# Patient Record
Sex: FEMALE | Race: WHITE | Employment: FULL TIME | URBAN - METROPOLITAN AREA
[De-identification: names, ages, dates, MRNs, and addresses within clinical notes are randomized per-mention and may not be internally consistent; named-entity substitution may affect disease eponyms.]

---

## 2018-08-14 PROBLEM — R10.9 FLANK PAIN: Status: ACTIVE | Noted: 2018-08-14

## 2019-04-14 ENCOUNTER — ANESTHESIA EVENT (OUTPATIENT)
Dept: SURGERY | Age: 29
DRG: 661 | End: 2019-04-14
Payer: COMMERCIAL

## 2019-04-14 ENCOUNTER — APPOINTMENT (OUTPATIENT)
Dept: GENERAL RADIOLOGY | Age: 29
DRG: 661 | End: 2019-04-14
Attending: UROLOGY
Payer: COMMERCIAL

## 2019-04-14 ENCOUNTER — HOSPITAL ENCOUNTER (INPATIENT)
Age: 29
LOS: 2 days | Discharge: HOME | DRG: 661 | End: 2019-04-16
Attending: EMERGENCY MEDICINE | Admitting: HOSPITALIST
Payer: COMMERCIAL

## 2019-04-14 ENCOUNTER — APPOINTMENT (OUTPATIENT)
Dept: CT IMAGING | Age: 29
DRG: 661 | End: 2019-04-14
Attending: NURSE PRACTITIONER
Payer: COMMERCIAL

## 2019-04-14 ENCOUNTER — ANESTHESIA (OUTPATIENT)
Dept: SURGERY | Age: 29
DRG: 661 | End: 2019-04-14
Payer: COMMERCIAL

## 2019-04-14 DIAGNOSIS — R10.9 FLANK PAIN: ICD-10-CM

## 2019-04-14 DIAGNOSIS — N20.0 KIDNEY STONE: ICD-10-CM

## 2019-04-14 DIAGNOSIS — N12 PYELONEPHRITIS: Primary | ICD-10-CM

## 2019-04-14 DIAGNOSIS — N39.0 ACUTE UTI (URINARY TRACT INFECTION): ICD-10-CM

## 2019-04-14 PROBLEM — N13.30 HYDRONEPHROSIS: Status: ACTIVE | Noted: 2019-04-14

## 2019-04-14 LAB
ALBUMIN SERPL-MCNC: 3.3 G/DL (ref 3.4–5)
ALBUMIN/GLOB SERPL: 0.9 {RATIO} (ref 0.8–1.7)
ALP SERPL-CCNC: 71 U/L (ref 45–117)
ALT SERPL-CCNC: 17 U/L (ref 13–56)
ANION GAP SERPL CALC-SCNC: 7 MMOL/L (ref 3–18)
APPEARANCE UR: CLEAR
AST SERPL-CCNC: 10 U/L (ref 15–37)
BACTERIA URNS QL MICRO: ABNORMAL /HPF
BASOPHILS # BLD: 0 K/UL (ref 0–0.1)
BASOPHILS NFR BLD: 0 % (ref 0–2)
BILIRUB SERPL-MCNC: 0.4 MG/DL (ref 0.2–1)
BILIRUB UR QL: NEGATIVE
BUN SERPL-MCNC: 7 MG/DL (ref 7–18)
BUN/CREAT SERPL: 8 (ref 12–20)
CALCIUM SERPL-MCNC: 8.8 MG/DL (ref 8.5–10.1)
CHLORIDE SERPL-SCNC: 106 MMOL/L (ref 100–108)
CO2 SERPL-SCNC: 23 MMOL/L (ref 21–32)
COLOR UR: YELLOW
CREAT SERPL-MCNC: 0.86 MG/DL (ref 0.6–1.3)
DIFFERENTIAL METHOD BLD: ABNORMAL
EOSINOPHIL # BLD: 0.1 K/UL (ref 0–0.4)
EOSINOPHIL NFR BLD: 0 % (ref 0–5)
EPITH CASTS URNS QL MICRO: ABNORMAL /LPF (ref 0–5)
ERYTHROCYTE [DISTWIDTH] IN BLOOD BY AUTOMATED COUNT: 12.2 % (ref 11.6–14.5)
GLOBULIN SER CALC-MCNC: 3.7 G/DL (ref 2–4)
GLUCOSE SERPL-MCNC: 108 MG/DL (ref 74–99)
GLUCOSE UR STRIP.AUTO-MCNC: NEGATIVE MG/DL
HCG UR QL: NEGATIVE
HCT VFR BLD AUTO: 34.7 % (ref 35–45)
HGB BLD-MCNC: 11.5 G/DL (ref 12–16)
HGB UR QL STRIP: ABNORMAL
KETONES UR QL STRIP.AUTO: NEGATIVE MG/DL
LEUKOCYTE ESTERASE UR QL STRIP.AUTO: ABNORMAL
LYMPHOCYTES # BLD: 0.9 K/UL (ref 0.9–3.6)
LYMPHOCYTES NFR BLD: 5 % (ref 21–52)
MCH RBC QN AUTO: 29.4 PG (ref 24–34)
MCHC RBC AUTO-ENTMCNC: 33.1 G/DL (ref 31–37)
MCV RBC AUTO: 88.7 FL (ref 74–97)
MONOCYTES # BLD: 1.6 K/UL (ref 0.05–1.2)
MONOCYTES NFR BLD: 9 % (ref 3–10)
NEUTS SEG # BLD: 16 K/UL (ref 1.8–8)
NEUTS SEG NFR BLD: 86 % (ref 40–73)
NITRITE UR QL STRIP.AUTO: NEGATIVE
PH UR STRIP: 6.5 [PH] (ref 5–8)
PLATELET # BLD AUTO: 237 K/UL (ref 135–420)
PMV BLD AUTO: 9.3 FL (ref 9.2–11.8)
POTASSIUM SERPL-SCNC: 4.3 MMOL/L (ref 3.5–5.5)
PROT SERPL-MCNC: 7 G/DL (ref 6.4–8.2)
PROT UR STRIP-MCNC: NEGATIVE MG/DL
RBC # BLD AUTO: 3.91 M/UL (ref 4.2–5.3)
RBC #/AREA URNS HPF: ABNORMAL /HPF (ref 0–5)
SODIUM SERPL-SCNC: 136 MMOL/L (ref 136–145)
SP GR UR REFRACTOMETRY: 1.01 (ref 1–1.03)
UROBILINOGEN UR QL STRIP.AUTO: 0.2 EU/DL (ref 0.2–1)
WBC # BLD AUTO: 18.5 K/UL (ref 4.6–13.2)
WBC URNS QL MICRO: ABNORMAL /HPF (ref 0–4)

## 2019-04-14 PROCEDURE — 99283 EMERGENCY DEPT VISIT LOW MDM: CPT

## 2019-04-14 PROCEDURE — 80053 COMPREHEN METABOLIC PANEL: CPT

## 2019-04-14 PROCEDURE — 81025 URINE PREGNANCY TEST: CPT

## 2019-04-14 PROCEDURE — 96361 HYDRATE IV INFUSION ADD-ON: CPT

## 2019-04-14 PROCEDURE — 77030018832 HC SOL IRR H20 ICUM -A: Performed by: UROLOGY

## 2019-04-14 PROCEDURE — 77030012510 HC MSK AIRWY LMA TELE -B: Performed by: NURSE ANESTHETIST, CERTIFIED REGISTERED

## 2019-04-14 PROCEDURE — 74011000258 HC RX REV CODE- 258: Performed by: NURSE PRACTITIONER

## 2019-04-14 PROCEDURE — 74176 CT ABD & PELVIS W/O CONTRAST: CPT

## 2019-04-14 PROCEDURE — 96374 THER/PROPH/DIAG INJ IV PUSH: CPT

## 2019-04-14 PROCEDURE — 77030032490 HC SLV COMPR SCD KNE COVD -B: Performed by: UROLOGY

## 2019-04-14 PROCEDURE — 85025 COMPLETE CBC W/AUTO DIFF WBC: CPT

## 2019-04-14 PROCEDURE — 74011250636 HC RX REV CODE- 250/636: Performed by: NURSE PRACTITIONER

## 2019-04-14 PROCEDURE — 77030018836 HC SOL IRR NACL ICUM -A: Performed by: UROLOGY

## 2019-04-14 PROCEDURE — 96375 TX/PRO/DX INJ NEW DRUG ADDON: CPT

## 2019-04-14 PROCEDURE — 87086 URINE CULTURE/COLONY COUNT: CPT

## 2019-04-14 PROCEDURE — 74011250636 HC RX REV CODE- 250/636: Performed by: HOSPITALIST

## 2019-04-14 PROCEDURE — C1894 INTRO/SHEATH, NON-LASER: HCPCS | Performed by: UROLOGY

## 2019-04-14 PROCEDURE — 77030021163 HC TUBE CYSTO IRR ICUM -A: Performed by: UROLOGY

## 2019-04-14 PROCEDURE — C2617 STENT, NON-COR, TEM W/O DEL: HCPCS | Performed by: UROLOGY

## 2019-04-14 PROCEDURE — 76210000006 HC OR PH I REC 0.5 TO 1 HR: Performed by: UROLOGY

## 2019-04-14 PROCEDURE — 77030013079 HC BLNKT BAIR HGGR 3M -A: Performed by: NURSE ANESTHETIST, CERTIFIED REGISTERED

## 2019-04-14 PROCEDURE — 77030018846 HC SOL IRR STRL H20 ICUM -A: Performed by: UROLOGY

## 2019-04-14 PROCEDURE — 74011000272 HC RX REV CODE- 272: Performed by: UROLOGY

## 2019-04-14 PROCEDURE — 76010000159 HC OR TIME FIRST 0.5 HR INTENSV-TIER 1: Performed by: UROLOGY

## 2019-04-14 PROCEDURE — 74011250636 HC RX REV CODE- 250/636

## 2019-04-14 PROCEDURE — 0T778DZ DILATION OF LEFT URETER WITH INTRALUMINAL DEVICE, VIA NATURAL OR ARTIFICIAL OPENING ENDOSCOPIC: ICD-10-PCS | Performed by: UROLOGY

## 2019-04-14 PROCEDURE — 74430 CONTRAST X-RAY BLADDER: CPT

## 2019-04-14 PROCEDURE — 76060000031 HC ANESTHESIA FIRST 0.5 HR: Performed by: UROLOGY

## 2019-04-14 PROCEDURE — 65270000029 HC RM PRIVATE

## 2019-04-14 PROCEDURE — 81001 URINALYSIS AUTO W/SCOPE: CPT

## 2019-04-14 PROCEDURE — 74011000250 HC RX REV CODE- 250: Performed by: UROLOGY

## 2019-04-14 DEVICE — INLAY OPTIMA URETERAL STENT W/O GUIDEWIRE
Type: IMPLANTABLE DEVICE | Site: URETER | Status: FUNCTIONAL
Brand: BARD® INLAY OPTIMA® URETERAL STENT

## 2019-04-14 RX ORDER — FENTANYL CITRATE 50 UG/ML
INJECTION, SOLUTION INTRAMUSCULAR; INTRAVENOUS
Status: COMPLETED
Start: 2019-04-14 | End: 2019-04-14

## 2019-04-14 RX ORDER — MIDAZOLAM HYDROCHLORIDE 1 MG/ML
INJECTION, SOLUTION INTRAMUSCULAR; INTRAVENOUS AS NEEDED
Status: DISCONTINUED | OUTPATIENT
Start: 2019-04-14 | End: 2019-04-14 | Stop reason: HOSPADM

## 2019-04-14 RX ORDER — ENOXAPARIN SODIUM 100 MG/ML
40 INJECTION SUBCUTANEOUS EVERY 24 HOURS
Status: DISCONTINUED | OUTPATIENT
Start: 2019-04-14 | End: 2019-04-16 | Stop reason: HOSPADM

## 2019-04-14 RX ORDER — FENTANYL CITRATE 50 UG/ML
INJECTION, SOLUTION INTRAMUSCULAR; INTRAVENOUS AS NEEDED
Status: DISCONTINUED | OUTPATIENT
Start: 2019-04-14 | End: 2019-04-14 | Stop reason: HOSPADM

## 2019-04-14 RX ORDER — FENTANYL CITRATE 50 UG/ML
25 INJECTION, SOLUTION INTRAMUSCULAR; INTRAVENOUS
Status: DISCONTINUED | OUTPATIENT
Start: 2019-04-14 | End: 2019-04-14 | Stop reason: HOSPADM

## 2019-04-14 RX ORDER — DEXAMETHASONE SODIUM PHOSPHATE 4 MG/ML
INJECTION, SOLUTION INTRA-ARTICULAR; INTRALESIONAL; INTRAMUSCULAR; INTRAVENOUS; SOFT TISSUE AS NEEDED
Status: DISCONTINUED | OUTPATIENT
Start: 2019-04-14 | End: 2019-04-14 | Stop reason: HOSPADM

## 2019-04-14 RX ORDER — NALOXONE HYDROCHLORIDE 0.4 MG/ML
0.2 INJECTION, SOLUTION INTRAMUSCULAR; INTRAVENOUS; SUBCUTANEOUS AS NEEDED
Status: DISCONTINUED | OUTPATIENT
Start: 2019-04-14 | End: 2019-04-14 | Stop reason: HOSPADM

## 2019-04-14 RX ORDER — ZOLPIDEM TARTRATE 5 MG/1
5 TABLET ORAL
Status: DISCONTINUED | OUTPATIENT
Start: 2019-04-14 | End: 2019-04-16 | Stop reason: HOSPADM

## 2019-04-14 RX ORDER — FENTANYL CITRATE 50 UG/ML
50 INJECTION, SOLUTION INTRAMUSCULAR; INTRAVENOUS
Status: DISCONTINUED | OUTPATIENT
Start: 2019-04-14 | End: 2019-04-14 | Stop reason: HOSPADM

## 2019-04-14 RX ORDER — SODIUM CHLORIDE 9 MG/ML
125 INJECTION, SOLUTION INTRAVENOUS CONTINUOUS
Status: DISCONTINUED | OUTPATIENT
Start: 2019-04-14 | End: 2019-04-16

## 2019-04-14 RX ORDER — PROPOFOL 10 MG/ML
INJECTION, EMULSION INTRAVENOUS AS NEEDED
Status: DISCONTINUED | OUTPATIENT
Start: 2019-04-14 | End: 2019-04-14 | Stop reason: HOSPADM

## 2019-04-14 RX ORDER — SODIUM CHLORIDE 9 MG/ML
125 INJECTION, SOLUTION INTRAVENOUS CONTINUOUS
Status: DISCONTINUED | OUTPATIENT
Start: 2019-04-14 | End: 2019-04-14 | Stop reason: HOSPADM

## 2019-04-14 RX ORDER — HYDROMORPHONE HYDROCHLORIDE 1 MG/ML
1 INJECTION, SOLUTION INTRAMUSCULAR; INTRAVENOUS; SUBCUTANEOUS
Status: DISCONTINUED | OUTPATIENT
Start: 2019-04-14 | End: 2019-04-16 | Stop reason: HOSPADM

## 2019-04-14 RX ORDER — ACETAMINOPHEN 325 MG/1
650 TABLET ORAL
Status: DISCONTINUED | OUTPATIENT
Start: 2019-04-14 | End: 2019-04-16 | Stop reason: HOSPADM

## 2019-04-14 RX ORDER — LIDOCAINE HYDROCHLORIDE 20 MG/ML
INJECTION, SOLUTION EPIDURAL; INFILTRATION; INTRACAUDAL; PERINEURAL AS NEEDED
Status: DISCONTINUED | OUTPATIENT
Start: 2019-04-14 | End: 2019-04-14 | Stop reason: HOSPADM

## 2019-04-14 RX ORDER — ONDANSETRON 4 MG/1
4 TABLET, ORALLY DISINTEGRATING ORAL
Status: DISCONTINUED | OUTPATIENT
Start: 2019-04-14 | End: 2019-04-16 | Stop reason: HOSPADM

## 2019-04-14 RX ORDER — SODIUM CHLORIDE 9 MG/ML
INJECTION, SOLUTION INTRAVENOUS
Status: DISCONTINUED | OUTPATIENT
Start: 2019-04-14 | End: 2019-04-14 | Stop reason: HOSPADM

## 2019-04-14 RX ORDER — KETOROLAC TROMETHAMINE 30 MG/ML
30 INJECTION, SOLUTION INTRAMUSCULAR; INTRAVENOUS
Status: COMPLETED | OUTPATIENT
Start: 2019-04-14 | End: 2019-04-14

## 2019-04-14 RX ORDER — ONDANSETRON 2 MG/ML
4 INJECTION INTRAMUSCULAR; INTRAVENOUS
Status: DISCONTINUED | OUTPATIENT
Start: 2019-04-14 | End: 2019-04-14 | Stop reason: HOSPADM

## 2019-04-14 RX ORDER — MORPHINE SULFATE 4 MG/ML
8 INJECTION INTRAVENOUS
Status: COMPLETED | OUTPATIENT
Start: 2019-04-14 | End: 2019-04-14

## 2019-04-14 RX ORDER — HYDROMORPHONE HYDROCHLORIDE 1 MG/ML
2 INJECTION, SOLUTION INTRAMUSCULAR; INTRAVENOUS; SUBCUTANEOUS
Status: COMPLETED | OUTPATIENT
Start: 2019-04-14 | End: 2019-04-14

## 2019-04-14 RX ORDER — ONDANSETRON 2 MG/ML
INJECTION INTRAMUSCULAR; INTRAVENOUS AS NEEDED
Status: DISCONTINUED | OUTPATIENT
Start: 2019-04-14 | End: 2019-04-14 | Stop reason: HOSPADM

## 2019-04-14 RX ADMIN — SODIUM CHLORIDE 125 ML/HR: 900 INJECTION, SOLUTION INTRAVENOUS at 17:48

## 2019-04-14 RX ADMIN — FENTANYL CITRATE 50 MCG: 50 INJECTION, SOLUTION INTRAMUSCULAR; INTRAVENOUS at 21:17

## 2019-04-14 RX ADMIN — ONDANSETRON 4 MG: 2 INJECTION INTRAMUSCULAR; INTRAVENOUS at 20:43

## 2019-04-14 RX ADMIN — SODIUM CHLORIDE 125 ML/HR: 900 INJECTION, SOLUTION INTRAVENOUS at 22:40

## 2019-04-14 RX ADMIN — FENTANYL CITRATE 50 MCG: 50 INJECTION, SOLUTION INTRAMUSCULAR; INTRAVENOUS at 21:27

## 2019-04-14 RX ADMIN — ENOXAPARIN SODIUM 40 MG: 40 INJECTION SUBCUTANEOUS at 22:55

## 2019-04-14 RX ADMIN — FENTANYL CITRATE 50 MCG: 50 INJECTION, SOLUTION INTRAMUSCULAR; INTRAVENOUS at 20:37

## 2019-04-14 RX ADMIN — MIDAZOLAM HYDROCHLORIDE 2 MG: 1 INJECTION, SOLUTION INTRAMUSCULAR; INTRAVENOUS at 20:33

## 2019-04-14 RX ADMIN — FENTANYL CITRATE 25 MCG: 50 INJECTION, SOLUTION INTRAMUSCULAR; INTRAVENOUS at 20:42

## 2019-04-14 RX ADMIN — FENTANYL CITRATE 25 MCG: 50 INJECTION, SOLUTION INTRAMUSCULAR; INTRAVENOUS at 20:49

## 2019-04-14 RX ADMIN — SODIUM CHLORIDE 1000 ML: 900 INJECTION, SOLUTION INTRAVENOUS at 13:10

## 2019-04-14 RX ADMIN — LIDOCAINE HYDROCHLORIDE 40 MG: 20 INJECTION, SOLUTION EPIDURAL; INFILTRATION; INTRACAUDAL; PERINEURAL at 20:37

## 2019-04-14 RX ADMIN — CEFTRIAXONE 1 G: 1 INJECTION, POWDER, FOR SOLUTION INTRAMUSCULAR; INTRAVENOUS at 16:26

## 2019-04-14 RX ADMIN — HYDROMORPHONE HYDROCHLORIDE 2 MG: 1 INJECTION, SOLUTION INTRAMUSCULAR; INTRAVENOUS; SUBCUTANEOUS at 16:20

## 2019-04-14 RX ADMIN — MORPHINE SULFATE 8 MG: 4 INJECTION INTRAVENOUS at 13:41

## 2019-04-14 RX ADMIN — PROPOFOL 150 MG: 10 INJECTION, EMULSION INTRAVENOUS at 20:37

## 2019-04-14 RX ADMIN — SODIUM CHLORIDE: 9 INJECTION, SOLUTION INTRAVENOUS at 20:33

## 2019-04-14 RX ADMIN — DEXAMETHASONE SODIUM PHOSPHATE 4 MG: 4 INJECTION, SOLUTION INTRA-ARTICULAR; INTRALESIONAL; INTRAMUSCULAR; INTRAVENOUS; SOFT TISSUE at 20:43

## 2019-04-14 RX ADMIN — KETOROLAC TROMETHAMINE 30 MG: 30 INJECTION, SOLUTION INTRAMUSCULAR; INTRAVENOUS at 13:12

## 2019-04-14 NOTE — ROUTINE PROCESS
TRANSFER - ED to INPATIENT REPORT: 
 
SBAR report made available to receiving floor on this patient being transferred to 2 [place] ([id number])  for routine progression of care Admitting diagnosis UTI (urinary tract infection) [N39.0] Information from the following report(s) SBAR, ED Summary, Intake/Output, MAR and Recent Results was made available to receiving floor. Lines:  
Peripheral IV 04/14/19 Right Antecubital (Active) Site Assessment Clean, dry, & intact 4/14/2019  1:21 PM  
Phlebitis Assessment 0 4/14/2019  1:21 PM  
Infiltration Assessment 0 4/14/2019  1:21 PM  
Dressing Status Clean, dry, & intact 4/14/2019  1:21 PM  
Dressing Type Transparent 4/14/2019  1:21 PM  
Hub Color/Line Status Pink;Flushed 4/14/2019  1:21 PM  
Action Taken Blood drawn 4/14/2019  1:21 PM  
  
 
Medication list confirmed with patient Opportunity for questions and clarification was provided. Patient is oriented to time, place, person and situation Patient is  continent and ambulatory without assist 
  
Valuables transported with patient Patient transported with: 
 Tech 
 
  =Monitored (most recent) Vitals w/ MEWS Score (last day) Date/Time MEWS Score Pulse Resp Temp BP Level of Consciousness SpO2  
 04/14/19 16:29:02  2  106  (Abnormal)   18  98.8 °F (37.1 °C)  113/74  Alert  100 % 04/14/19 15:14:58    100  18    117/74  Alert  100 % 04/14/19 1306  3  125  (Abnormal)   18  98.2 °F (36.8 °C)  136/78  Alert  100 % Septic Patients:  
 
Lactic Acid: None ordered. ALL LACTIC ACIDS GREATER THAN 2 MUST BE REPEATED POC WITHIN 4 HOURS OR PRIOR TO ADMISSION Total Fluid Bolus initiated and documented on MAR: YES 1 liter NS infused.

## 2019-04-14 NOTE — PROGRESS NOTES
Internal Medicine History and Physical 
   
 
 
Subjective HPI: Simba Singh is a 26 y.o. female with a PMHx of multiple kidney stones requiring lithotripsy who presented to the ED with flank pain for the past four days. She c/o dysuria, abd pain, nausea as well. Denies fevers, chills, and vomiting. In the ED urology was consulted, and CT abd shows left sided hydronephrosis with 3mm stone, and bilateral non obstructive stones, as well as an UTI. She will be admitted for further eval 
 
PMHx: 
Past Medical History:  
Diagnosis Date  Asthma  Flank pain  Kidney stone PSurgHx: 
Past Surgical History:  
Procedure Laterality Date  HX LITHOTRIPSY  07/11/2012 LITHOTRIPSY Procedure:LITHOTRIPSY; Surgeon:JASPER MERIDA; Location:[facility] MAIN OR LOC; Service:Urology; Laterality:Right; ESWL RIGHT  HX LITHOTRIPSY  08/08/2012  
 :LITHOTRIPSY; Roly Richard; Location:[facility] MAIN OR LOC; Service:Urology; Laterality:Left; ESWL LEFT CPT - 51592  
 HX TONSILLECTOMY SocialHx: 
Social History Socioeconomic History  Marital status: SINGLE Spouse name: Not on file  Number of children: Not on file  Years of education: Not on file  Highest education level: Not on file Occupational History  Not on file Social Needs  Financial resource strain: Not on file  Food insecurity:  
  Worry: Not on file Inability: Not on file  Transportation needs:  
  Medical: Not on file Non-medical: Not on file Tobacco Use  Smoking status: Never Smoker  Smokeless tobacco: Never Used Substance and Sexual Activity  Alcohol use: Yes Alcohol/week: 1.8 oz Types: 3 Cans of beer per week Comment: occasional  
 Drug use: No  
 Sexual activity: Not Currently Partners: Male Lifestyle  Physical activity:  
  Days per week: Not on file Minutes per session: Not on file  Stress: Not on file Relationships  Social connections: Talks on phone: Not on file Gets together: Not on file Attends Tenriism service: Not on file Active member of club or organization: Not on file Attends meetings of clubs or organizations: Not on file Relationship status: Not on file  Intimate partner violence:  
  Fear of current or ex partner: Not on file Emotionally abused: Not on file Physically abused: Not on file Forced sexual activity: Not on file Other Topics Concern  Not on file Social History Narrative  Not on file Allergies: 
No Known Allergies FamilyHx: 
Family History Problem Relation Age of Onset  Kidney Disease Mother  Kidney Disease Father  Heart Disease Father Prior to Admission Medications Prescriptions Last Dose Informant Patient Reported? Taking? ALPRAZolam (XANAX) 0.25 mg tablet   Yes No  
clindamycin-benzoyl peroxide (BENZACLIN) 1-5 % topical gel   Yes No  
eszopiclone (LUNESTA) 2 mg tablet   Yes No  
fluticasone propionate (FLONASE) 50 mcg/actuation nasal spray   Yes No  
loratadine (CLARITIN) 10 mg tablet   Yes No  
medroxyprogesterone acetate (DEPO-PROVERA IM)   Yes No  
Sig: by IntraMUSCular route. ondansetron (ZOFRAN ODT) 4 mg disintegrating tablet   No No  
Sig: Take 1 Tab by mouth every eight (8) hours as needed for Nausea. Facility-Administered Medications: None Review of Systems: 
CONST: Fever, weight loss, fatigue or chills HEENT: Recent changes in vision, vertigo, epistaxis, dysphagia and hoarseness CV: Chest pain, palpitations, edema and varicosities RESP: Cough, shortness of breath, wheezing, hemoptysis, snoring and reactive airway disease GI: Nausea, vomiting, abdominal pain, change in bowel habits, hematochezia, melena, and GERD 
: Hematuria, dysuria, frequency, urgency, nocturia and stress urinary incontinence, flank pain MS: Weakness, joint pain and arthritis ENDO: Polyuria, polydipsia, polyphagia, poor wound healing, heat intolerance, cold intolerance LYMPH/HEME: Anemia, bruising and history of blood transfusions INTEG: Dermatitis, abnormal moles NEURO: Dizziness, headache, fainting, seizures and stroke PSYCH: Anxiety and depression A comprehensive review of systems was negative except for that written in the History of Present Illness. 
  
Objective Visit Vitals /74 (BP 1 Location: Right arm, BP Patient Position: At rest) Pulse 100 Temp 98.2 °F (36.8 °C) Resp 18 Ht 5' 6\" (1.676 m) Wt 61.2 kg (135 lb) SpO2 100% BMI 21.79 kg/m² Physical Exam: 
General Appearance: appears to be in mild distress, conversant, non-toxic appearing HENT: normocephalic/atraumatic, moist mucus membranes Lungs: CTA with normal respiratory effort Cardiovascular: RRR, no m/r/g, capillary refill < 2 sec, B/L DP/PT pulses +3/4 Abdomen: soft, non distended, non-tender, active bowel sounds, no guarding or rigidity Extremities: no cyanosis, no peripheral edema, good perfusion bilaterally Neuro: moves all extremities, no focal deficits, nml tone Psych: appropriate affect, alert and oriented to person, place and time Laboratory Studies: All lab results for the last 24 hours reviewed. Imaging Reviewed: 
Ct Abd Pelv Wo Cont Result Date: 4/14/2019 EXAM: CT of the abdomen and pelvis INDICATION: Left flank abdominal pain COMPARISON: None. TECHNIQUE: Axial CT imaging of the abdomen and pelvis was performed with intravenous contrast. Multiplanar reformats were generated. One or more dose reduction techniques were used on this CT: automated exposure control, adjustment of the mAs and/or kVp according to patient size, and iterative reconstruction techniques. The specific techniques used on this CT exam have been documented in the patient's electronic medical record.  Digital Imaging and Communications in Medicine (DICOM) format image data are available to nonaffiliated external healthcare facilities or entities on a secure, media free, reciprocally searchable basis with patient authorization for at least a 12-month period after this study. _______________ FINDINGS: LOWER CHEST: Minimal basilar dependent changes without consolidation or effusion. Partial visualized bilateral breast implants. LIVER, BILIARY: Liver is unremarkable on noncontrast imaging. No biliary dilation. Gallbladder is unremarkable. PANCREAS: Normal. SPLEEN: Normal. ADRENALS: Normal. KIDNEYS, URETERS, BLADDER:   > Mild left-sided hydroureteronephrosis secondary to a 3 mm mid to proximal ureteral calculus, 4 to 5 cm below the UPJ.   > Additional bilateral nonobstructive renal calculi are present measuring 2 to 3 mm in size. No findings of right-sided hydronephrosis or hydroureter. Unremarkable bladder GASTROINTESTINAL TRACT: No bowel dilation or wall thickening. Normal appendix. LYMPH NODES: No enlarged lymph nodes. PELVIC ORGANS: Unremarkable. VASCULATURE: Unremarkable. OSSEOUS: No acute or aggressive osseous abnormalities identified. OTHER: None. _______________ IMPRESSION: 1. Left-sided hydronephrosis secondary to a 3 mm, mid to proximal ureteral stone. 2. Bilateral nonobstructive renal calculi. Assessment/Plan Active Hospital Problems Diagnosis Date Noted  UTI (urinary tract infection) 04/14/2019 Priority: 1 - One  Kidney stone Priority: 2 - Two  Hydronephrosis 04/14/2019 Priority: 3 - Three UTI Iv abx Hydronephrosis 
left sided 3mm mid to proximal ureteral calculus, 4-5 cm below UPJ Urology consult Most likely due to kidney stone Kidney stone IV fluids Pain control Suspect infected stone WBC 18.5 trend Potentially OR tonight? 
 
- Cont acceptable home medications for chronic conditions  
- DVT protocol I have personally reviewed all pertinent labs, films and EKGs that have officially resulted.  I reviewed available electronic documentation outlining the initial presentation as well as the emergency room physician's encounter. Donny Lira PA-C Internal Medicine, Hospitalist 
Pager: [phone] [facility]

## 2019-04-14 NOTE — ED PROVIDER NOTES
HPI  
 
Past Medical History:  
Diagnosis Date  Asthma  Flank pain  Kidney stone Past Surgical History:  
Procedure Laterality Date  HX LITHOTRIPSY  07/11/2012 LITHOTRIPSY Procedure:LITHOTRIPSY; Surgeon:JASPER MERIDA; Location:[facility] MAIN OR LOC; Service:Urology; Laterality:Right; ESWL RIGHT  HX LITHOTRIPSY  08/08/2012  
 :LITHOTRIPSY; Lilia Moreno; Location:[facility] MAIN OR LOC; Service:Urology; Laterality:Left; ESWL LEFT CPT - 31067  
 HX TONSILLECTOMY Family History:  
Problem Relation Age of Onset  Kidney Disease Mother  Kidney Disease Father  Heart Disease Father Social History Socioeconomic History  Marital status: SINGLE Spouse name: Not on file  Number of children: Not on file  Years of education: Not on file  Highest education level: Not on file Occupational History  Not on file Social Needs  Financial resource strain: Not on file  Food insecurity:  
  Worry: Not on file Inability: Not on file  Transportation needs:  
  Medical: Not on file Non-medical: Not on file Tobacco Use  Smoking status: Never Smoker  Smokeless tobacco: Never Used Substance and Sexual Activity  Alcohol use: Yes Alcohol/week: 1.8 oz Types: 3 Cans of beer per week Comment: occasional  
 Drug use: No  
 Sexual activity: Not Currently Partners: Male Lifestyle  Physical activity:  
  Days per week: Not on file Minutes per session: Not on file  Stress: Not on file Relationships  Social connections:  
  Talks on phone: Not on file Gets together: Not on file Attends Mormonism service: Not on file Active member of club or organization: Not on file Attends meetings of clubs or organizations: Not on file Relationship status: Not on file  Intimate partner violence:  
  Fear of current or ex partner: Not on file Emotionally abused: Not on file Physically abused: Not on file Forced sexual activity: Not on file Other Topics Concern  Not on file Social History Narrative  Not on file ALLERGIES: Patient has no known allergies. Review of Systems Vitals:  
 04/14/19 1306 04/14/19 1514 BP: 136/78 117/74 Pulse: (!) 125 100 Resp: 18 18 Temp: 98.2 °F (36.8 °C) SpO2: 100% 100% Weight: 61.2 kg (135 lb) Height: 5' 6\" (1.676 m) Physical Exam  
 
MDM Number of Diagnoses or Management Options Diagnosis management comments: CONSULT:  Discussed the Pt wieth Dr George Adams, who has accepted the Pt for admission. Discussed the Pt with  [facility] (Urology) who has advised he will consult on her, and may take her to the OR this evening. Salazar Penn NP  4:03 PM 
 
 
 
 
  
Amount and/or Complexity of Data Reviewed Clinical lab tests: ordered and reviewed Tests in the radiology section of CPT®: ordered and reviewed Risk of Complications, Morbidity, and/or Mortality Presenting problems: moderate Diagnostic procedures: moderate Management options: moderate Procedures Diagnosis: 1. Pyelonephritis 2. Flank pain 3. Acute UTI (urinary tract infection) Disposition:   ADMITTED - DR Geovanni Goodson Follow-up Information None Patient's Medications Start Taking No medications on file Continue Taking ALPRAZOLAM (XANAX) 0.25 MG TABLET      
 CLINDAMYCIN-BENZOYL PEROXIDE (BENZACLIN) 1-5 % TOPICAL GEL      
 ESZOPICLONE (LUNESTA) 2 MG TABLET      
 FLUTICASONE PROPIONATE (FLONASE) 50 MCG/ACTUATION NASAL SPRAY      
 LORATADINE (CLARITIN) 10 MG TABLET MEDROXYPROGESTERONE ACETATE (DEPO-PROVERA IM)    by IntraMUSCular route. ONDANSETRON (ZOFRAN ODT) 4 MG DISINTEGRATING TABLET    Take 1 Tab by mouth every eight (8) hours as needed for Nausea. These Medications have changed No medications on file Stop Taking No medications on file

## 2019-04-14 NOTE — ED NOTES
Patient informed about nothing by mouth. Dr. Marveen Homans talked with patient. Patient using phone in [facility].

## 2019-04-14 NOTE — ROUTINE PROCESS
TRANSFER - OUT REPORT: 
 
Verbal report given to Pravin Trejo on [address]  being transferred to 2 central room [phone] (unit) for routine progression of care Report consisted of patients Situation, Background, Assessment and  
Recommendations(SBAR). Information from the following report(s) SBAR, ED Summary, Intake/Output, MAR and Recent Results was reviewed with the receiving nurse. Lines:  
Peripheral IV 04/14/19 Right Antecubital (Active) Site Assessment Clean, dry, & intact 4/14/2019  1:21 PM  
Phlebitis Assessment 0 4/14/2019  1:21 PM  
Infiltration Assessment 0 4/14/2019  1:21 PM  
Dressing Status Clean, dry, & intact 4/14/2019  1:21 PM  
Dressing Type Transparent 4/14/2019  1:21 PM  
Hub Color/Line Status Pink;Flushed 4/14/2019  1:21 PM  
Action Taken Blood drawn 4/14/2019  1:21 PM  
  
 
Opportunity for questions and clarification was provided. Patient transported with: 
 [employer]

## 2019-04-14 NOTE — PROGRESS NOTES
TRANSFER - IN REPORT: 
 
Verbal report received from TED Garcia(name) on Marci Butler  being received from ED(unit) for routine progression of care Report consisted of patients Situation, Background, Assessment and  
Recommendations(SBAR). Information from the following report(s) SBAR, Kardex, Intake/Output and MAR was reviewed with the receiving nurse. Bedside and Verbal shift change report given to Delicia Hui RN (oncoming nurse) by TED Guaman (offgoing nurse). Report included the following information SBAR, Kardex, Intake/Output and MAR.

## 2019-04-14 NOTE — DISCHARGE INSTRUCTIONS
BuzzStarter Activation    Thank you for requesting access to [employer]Starter. Please follow the instructions below to securely access and download your online medical record. BuzzStarter allows you to send messages to your doctor, view your test results, renew your prescriptions, schedule appointments, and more. How Do I Sign Up? 1. In your internet browser, go to [email or web address]  2. Click on the First Time User? Click Here link in the Sign In box. You will be redirect to the New Member Sign Up page. 3. Enter your BuzzStarter Access Code exactly as it appears below. You will not need to use this code after youve completed the sign-up process. If you do not sign up before the expiration date, you must request a new code. BuzzStarter Access Code: [email or web address]  Expires: 2019  1:03 PM (This is the date your BuzzStarter access code will )    4. Enter the last four digits of your Social Security Number (xxxx) and Date of Birth (mm/dd/yyyy) as indicated and click Submit. You will be taken to the next sign-up page. 5. Create a BuzzStarter ID. This will be your BuzzStarter login ID and cannot be changed, so think of one that is secure and easy to remember. 6. Create a BuzzStarter password. You can change your password at any time. 7. Enter your Password Reset Question and Answer. This can be used at a later time if you forget your password. 8. Enter your e-mail address. You will receive e-mail notification when new information is available in [address] 9. Click Sign Up. You can now view and download portions of your medical record. 10. Click the Download Summary menu link to download a portable copy of your medical information. Additional Information    If you have questions, please visit the Frequently Asked Questions section of the BuzzStarter website at [email or web address] Remember, BuzzStarter is NOT to be used for urgent needs. For medical emergencies, dial 911.

## 2019-04-15 LAB
ANION GAP SERPL CALC-SCNC: 6 MMOL/L (ref 3–18)
BUN SERPL-MCNC: 8 MG/DL (ref 7–18)
BUN/CREAT SERPL: 11 (ref 12–20)
CALCIUM SERPL-MCNC: 8 MG/DL (ref 8.5–10.1)
CHLORIDE SERPL-SCNC: 112 MMOL/L (ref 100–108)
CO2 SERPL-SCNC: 24 MMOL/L (ref 21–32)
CREAT SERPL-MCNC: 0.72 MG/DL (ref 0.6–1.3)
ERYTHROCYTE [DISTWIDTH] IN BLOOD BY AUTOMATED COUNT: 12.3 % (ref 11.6–14.5)
GLUCOSE SERPL-MCNC: 155 MG/DL (ref 74–99)
HCT VFR BLD AUTO: 33.5 % (ref 35–45)
HGB BLD-MCNC: 10.8 G/DL (ref 12–16)
MCH RBC QN AUTO: 28.7 PG (ref 24–34)
MCHC RBC AUTO-ENTMCNC: 32.2 G/DL (ref 31–37)
MCV RBC AUTO: 89.1 FL (ref 74–97)
PLATELET # BLD AUTO: 263 K/UL (ref 135–420)
PMV BLD AUTO: 9.6 FL (ref 9.2–11.8)
POTASSIUM SERPL-SCNC: 5.3 MMOL/L (ref 3.5–5.5)
RBC # BLD AUTO: 3.76 M/UL (ref 4.2–5.3)
SODIUM SERPL-SCNC: 142 MMOL/L (ref 136–145)
WBC # BLD AUTO: 14.5 K/UL (ref 4.6–13.2)

## 2019-04-15 PROCEDURE — 65270000029 HC RM PRIVATE

## 2019-04-15 PROCEDURE — 80048 BASIC METABOLIC PNL TOTAL CA: CPT

## 2019-04-15 PROCEDURE — 74011250636 HC RX REV CODE- 250/636: Performed by: HOSPITALIST

## 2019-04-15 PROCEDURE — 74011000258 HC RX REV CODE- 258: Performed by: HOSPITALIST

## 2019-04-15 PROCEDURE — 85027 COMPLETE CBC AUTOMATED: CPT

## 2019-04-15 PROCEDURE — 74011250637 HC RX REV CODE- 250/637: Performed by: HOSPITALIST

## 2019-04-15 PROCEDURE — 36415 COLL VENOUS BLD VENIPUNCTURE: CPT

## 2019-04-15 RX ADMIN — SODIUM CHLORIDE 125 ML/HR: 900 INJECTION, SOLUTION INTRAVENOUS at 21:45

## 2019-04-15 RX ADMIN — ACETAMINOPHEN 650 MG: 325 TABLET, FILM COATED ORAL at 09:34

## 2019-04-15 RX ADMIN — HYDROMORPHONE HYDROCHLORIDE 1 MG: 1 INJECTION, SOLUTION INTRAMUSCULAR; INTRAVENOUS; SUBCUTANEOUS at 18:24

## 2019-04-15 RX ADMIN — CEFTRIAXONE 1 G: 1 INJECTION, POWDER, FOR SOLUTION INTRAMUSCULAR; INTRAVENOUS at 16:41

## 2019-04-15 RX ADMIN — SODIUM CHLORIDE 125 ML/HR: 900 INJECTION, SOLUTION INTRAVENOUS at 14:04

## 2019-04-15 RX ADMIN — ZOLPIDEM TARTRATE 5 MG: 5 TABLET ORAL at 00:21

## 2019-04-15 RX ADMIN — HYDROMORPHONE HYDROCHLORIDE 1 MG: 1 INJECTION, SOLUTION INTRAMUSCULAR; INTRAVENOUS; SUBCUTANEOUS at 14:04

## 2019-04-15 RX ADMIN — HYDROMORPHONE HYDROCHLORIDE 1 MG: 1 INJECTION, SOLUTION INTRAMUSCULAR; INTRAVENOUS; SUBCUTANEOUS at 10:38

## 2019-04-15 RX ADMIN — HYDROMORPHONE HYDROCHLORIDE 1 MG: 1 INJECTION, SOLUTION INTRAMUSCULAR; INTRAVENOUS; SUBCUTANEOUS at 21:41

## 2019-04-15 RX ADMIN — HYDROMORPHONE HYDROCHLORIDE 1 MG: 1 INJECTION, SOLUTION INTRAMUSCULAR; INTRAVENOUS; SUBCUTANEOUS at 01:45

## 2019-04-15 RX ADMIN — ENOXAPARIN SODIUM 40 MG: 40 INJECTION SUBCUTANEOUS at 17:37

## 2019-04-15 NOTE — PROGRESS NOTES
Problem: Discharge Planning Goal: *Discharge to safe environment Outcome: Progressing Towards Goal 
 Plan: home

## 2019-04-15 NOTE — OP NOTES
[facility]  OPERATIVE REPORT    Name:  Stacey Dangelo  MR#:   [id number]  :  1990  ACCOUNT #:  [de-identified]  DATE OF SERVICE:  2019    PREOPERATIVE DIAGNOSES:  Left hydronephrosis, left renal colic, urinary tract infection, leukocytosis. POSTOPERATIVE DIAGNOSES:  Left hydronephrosis, left renal colic, urinary tract infection, leukocytosis. PROCEDURE PERFORMED:  Cystoscopy, double-J stent placement. SURGEON:  Dylan Nguyễn MD    ASSISTANT:  none    ANESTHESIA:  General.    COMPLICATIONS:  None. SPECIMENS REMOVED:  Urine culture    IMPLANTS:  none    ESTIMATED BLOOD LOSS:  None. TUBES:  A 6 x 26 cm stent. FINDINGS:  The patient had moderate hydronephrosis of the upper ureter. PROCEDURE:  After informed consent was obtained, the patient was taken to the operating room, positioned in dorsal lithotomy under adequate general anesthesia. Preoperative time-out was performed. Preoperative antibiotics provided, sequential compression devices were applied. A 21-Jamaican cystoscope was inserted into the patient's urethral meatus and advanced into the bladder. The bladder was inspected and was normal in appearance. The left ureteral orifice was identified and intubated with 5-Jamaican open-ended ureteral catheter. Contrast was injected in a retrograde fashion. This highlighted filling defect in the upper ureter. Next, a wire was passed up the ureters to renal pelvis. A 5-Jamaican open-ended ureteral catheter was positioned in the renal pelvis and a urine culture was obtained after decompressing the renal pelvis. A 6 x 26 cm double-J stent was positioned without any difficulties under fluoroscopic guidance. The patient was awakened and taken to the recovery room.       Winsome Umaña MD      [id number]  D:  2019 23:18  T:  2019 23:51  JOB #:  [id number]

## 2019-04-15 NOTE — PROGRESS NOTES
Nutrition initial assessment/Plan of care RECOMMENDATIONS:  
1. Regular Diet 2. Monitor labs, weight and PO intake 3. RD to follow GOALS:  
1. PO intake meets >75% of protein/calorie needs by 4/22 2. Weight Maintenance (+/- 1-2 lb) by 4/22 ASSESSMENT:  
Wt status is classified as normal per Body mass index is 21.79 kg/m². Adequate PO intake. Labs noted. Pt w/ elevated WBC (14.5) and hypoalbuminemia. Nutrition recommendations listed. RD to follow. Nutrition Diagnoses:  
None at this time Nutrition Risk:  [] High  [] Moderate [x]  Low SUBJECTIVE/OBJECTIVE:  
 Pt admitted for UTI. Pt is s/p Cystoscopy, double-J stent placement on (4/14). MST received for 2-13 lb unintentional weight loss. Per documented weight records Pt was 146 lb in (08/2018) equating to an 11 lb or 7.5% loss x 8 months. Pt seen in room with  at bedside at lunch; appears well nourished. Denies having any food allergies or problems chewing/swallowing. Reports having a good appetite and actively making lifestyle changes at home including diet and exercise. Stated that her recent weight loss has been intentional d/t the lifestyle changes as stated above. Nothing to address at this time. Will continue to follow. Information Obtained from:  
 [x] Chart Review [x] Patient 
 [] Family/Caregiver 
 [] Nurse/Physician 
 [] Interdisciplinary Meeting/Rounds Diet: Regular Diet Medications: [x] Reviewed Allergies: [x] Reviewed Encounter Diagnoses ICD-10-CM ICD-9-CM 1. Pyelonephritis N12 590.80 2. Flank pain R10.9 789.09  
3. Acute UTI (urinary tract infection) N39.0 599.0 Past Medical History:  
Diagnosis Date  Asthma  Flank pain  Kidney stone Labs:   
Lab Results Component Value Date/Time  Sodium 142 04/15/2019 04:40 AM  
 Potassium 5.3 04/15/2019 04:40 AM  
 Chloride 112 (H) 04/15/2019 04:40 AM  
 CO2 24 04/15/2019 04:40 AM  
 Anion gap 6 04/15/2019 04:40 AM  
 Glucose 155 (H) 04/15/2019 04:40 AM  
 BUN 8 04/15/2019 04:40 AM  
 Creatinine 0.72 04/15/2019 04:40 AM  
 Calcium 8.0 (L) 04/15/2019 04:40 AM  
 Albumin 3.3 (L) 04/14/2019 01:16 PM  
 
Anthropometrics: BMI (calculated): 21.8 Last 3 Recorded Weights in this Encounter 04/14/19 1306 Weight: 61.2 kg (135 lb) Ht Readings from Last 1 Encounters:  
04/14/19 5' 6\" (1.676 m) Weight Metrics 4/14/2019 3/21/2019 9/21/2018 8/13/2018 6/18/2017 Weight 135 lb 136 lb 140 lb 146 lb 135 lb BMI 21.79 kg/m2 21.95 kg/m2 22.6 kg/m2 23.57 kg/m2 21.79 kg/m2 No data found. IBW: 130 lb %IBW: 104% UBW: ~140 lb [x] Weight Loss [] Weight Gain [] Weight Stable Estimated Nutrition Needs: [x] MSJ  [] Other: 
Calories: 7980-8963 kcal Based on:   [x] Actual BW   
Protein:   60-65 g Based on:   [x] Actual BW Fluid:       6935-7114 ml Based on:   [x] Actual BW  
 
 [x] No Cultural, Yarsanism or ethnic dietary need identified. [] Cultural, Yarsanism and ethnic food preferences identified and addressed Wt Status:  [x] Normal (18.6 - 24.9) [] Underweight (<18.5) [] Overweight (25 - 29.9) [] Mild Obesity (30 - 34.9)  [] Moderate Obesity (35 - 39.9) [] Morbid Obesity (40+) Nutrition Problems Identified:  
[] Suboptimal PO intake  
[] Food Allergies [] Difficulty chewing/swallowing/poor dentition 
[] Constipation/Diarrhea  
[] Nausea/Vomiting  
[x] None 
[] Other:  
 
Plan:  
[] Therapeutic Diet 
[]  Obtained/adjusted food preferences/tolerances and/or snacks options  
[]  Supplements added  
[] Occupational therapy following for feeding techniques []  HS snack added  
[]  Modify diet texture  
[]  Modify diet for food allergies []  Educate patient  
[]  Assist with menu selection  
[x]  Monitor PO intake on meal rounds  
[x]  Continue inpatient monitoring and intervention  
[]  Participated in discharge planning/Interdisciplinary rounds/Team meetings  
[]  Other:  
 
Education Needs: [] Not appropriate for teaching at this time due to: 
 [x] Identified and addressed Nutrition Monitoring and Evaluation: 
[x] Continue ongoing monitoring and intervention 
[] Dorcas Shanks

## 2019-04-15 NOTE — CONSULTS
ICD-10-CM ICD-9-CM    1. Pyelonephritis N12 590.80    2. Flank pain R10.9 789.09    3. Acute UTI (urinary tract infection) N39.0 599.0           ASSESSMENT:   Left renal colic, left ureteral stone  Fever/UTI  Hydronephrosis      PLAN:    Empiric Rocephin  [facility]   For ureteral stent placement qi Mancuso MD    [phone]    April 14, 2019 10:53 PM        Chief Complaint   Patient presents with   Mariela Harper Kidney Stone       HISTORY OF PRESENT ILLNESS:  Stalin Celaya is a 26 y.o. female who is seen in consultation as referred by Dr. Veronica Walker  for evaluation of stone. HPI:  Location left flanl  Duration 3 days  Associated signs/symptoms fevers, nausea, dysuria  Modifying factors pain meds  Severity severe    Past Medical History:   Diagnosis Date    Asthma     Flank pain     Kidney stone        Past Surgical History:   Procedure Laterality Date    HX LITHOTRIPSY  07/11/2012    LITHOTRIPSY Procedure:LITHOTRIPSY; Surgeon:JASPER MERIDA; Location:[facility] MAIN OR LOC; Service:Urology; Laterality:Right; ESWL RIGHT     HX LITHOTRIPSY  08/08/2012    :LITHOTRIPSY; Doug Goodman; Location:[facility] MAIN OR LOC; Service:Urology; Laterality:Left; ESWL LEFT CPT - 45587    HX TONSILLECTOMY         Social History     Tobacco Use    Smoking status: Never Smoker    Smokeless tobacco: Never Used   Substance Use Topics    Alcohol use:  Yes     Alcohol/week: 1.8 oz     Types: 3 Cans of beer per week     Comment: occasional    Drug use: No       No Known Allergies    Family History   Problem Relation Age of Onset    Kidney Disease Mother     Kidney Disease Father     Heart Disease Father        Current Facility-Administered Medications   Medication Dose Route Frequency Provider Last Rate Last Dose    zolpidem (AMBIEN) tablet 5 mg  5 mg Oral QHS PRN Lorena Keyes MD        ondansetron (ZOFRAN ODT) tablet 4 mg  4 mg Oral Q8H PRN MD Mariela Damon [START ON 4/15/2019] cefTRIAXone (ROCEPHIN) 1 g in 0.9% sodium chloride (MBP/ADV) 50 mL MBP  1 g IntraVENous Q24H Kailey James MD        HYDROmorphone (PF) (DILAUDID) injection 1 mg  1 mg IntraVENous Q3H PRN Kailey James MD        0.9% sodium chloride infusion  125 mL/hr IntraVENous CONTINUOUS Kailey James  mL/hr at 04/14/19 2240 125 mL/hr at 04/14/19 2240    acetaminophen (TYLENOL) tablet 650 mg  650 mg Oral Q4H PRN Kailey James MD        enoxaparin (LOVENOX) injection 40 mg  40 mg SubCUTAneous Q24H Kailey James MD   Stopped at 04/14/19 1800    naloxone [facility]) injection 0.2 mg  0.2 mg IntraVENous PRN Muna Peck CRNA        0.9% sodium chloride infusion  125 mL/hr IntraVENous CONTINUOUS Muna Peck CRNA        fentaNYL citrate (PF) injection 25 mcg  25 mcg IntraVENous Q4H PRN Muna Peck CRNA        fentaNYL citrate (PF) injection 50 mcg  50 mcg IntraVENous Q4H PRN Muna Peck CRNA   50 mcg at 04/14/19 2127    ondansetron (ZOFRAN) injection 4 mg  4 mg IntraVENous ONCE PRN Muna Peck CRNA             Review of Systems:  ROS is:    Negative for: Ophthalmologic issues, ENT issues, Cardiovascular issues, respiratory issues, GI issues, neurologic issues, hematoogic issues, skin lesions, musculoskeletal issues, psychiatric issues    PHYSICAL EXAMINATION:     Visit Vitals  /76 (BP 1 Location: Left arm, BP Patient Position: At rest)   Pulse (!) 129   Temp 99.2 °F (37.3 °C)   Resp 16   Ht 5' 6\" (1.676 m)   Wt 135 lb (61.2 kg)   SpO2 93%   BMI 21.79 kg/m²     Constitutional: Well developed, well-nourished female in no acute distress. HEENT: Normocephalic, Atraumatic   CV:   tachycardia  Respiratory: No respiratory distress or difficulties breathing   Abdomen:  Soft and nontender. No masses. No hepatosplenomegaly.  Female:  CVA tenderness: left   Skin:  Normal color. No evidence of jaundice. Neuro/Psych:  Patient with appropriate affect.   Alert and oriented. REVIEW OF LABS AND IMAGING:         Labs: Results:   Chemistry    Recent Labs     04/14/19  1316   *      K 4.3      CO2 23   BUN 7   CREA 0.86   CA 8.8   AGAP 7   BUCR 8*   AP 71   TP 7.0   ALB 3.3*   GLOB 3.7   AGRAT 0.9      CBC w/Diff Recent Labs     04/14/19  1316   WBC 18.5*   RBC 3.91*   HGB 11.5*   HCT 34.7*      GRANS 86*   LYMPH 5*   EOS 0      Cultures No results for input(s): CULT in the last 72 hours.   All Micro Results     Procedure Component Value Units Date/Time    CULTURE, URINE [[id number]] Collected:  04/14/19 2019    Order Status:  Completed Specimen:  Urine from Kidney Updated:  04/14/19 2145    CULTURE, URINE [[id number]] Collected:  04/14/19 1309    Order Status:  Completed Specimen:  Urine from Clean catch Updated:  04/14/19 2003            Urinalysis Color   Date Value Ref Range Status   04/14/2019 YELLOW   Final     Appearance   Date Value Ref Range Status   04/14/2019 CLEAR   Final     Specific gravity   Date Value Ref Range Status   04/14/2019 1.008 1.005 - 1.030   Final     pH (UA)   Date Value Ref Range Status   04/14/2019 6.5 5.0 - 8.0   Final     Protein   Date Value Ref Range Status   04/14/2019 NEGATIVE  NEG mg/dL Final     Ketone   Date Value Ref Range Status   04/14/2019 NEGATIVE  NEG mg/dL Final     Bilirubin   Date Value Ref Range Status   04/14/2019 NEGATIVE  NEG   Final     Blood   Date Value Ref Range Status   04/14/2019 LARGE (A) NEG   Final     Urobilinogen   Date Value Ref Range Status   04/14/2019 0.2 0.2 - 1.0 EU/dL Final     Nitrites   Date Value Ref Range Status   04/14/2019 NEGATIVE  NEG   Final     Leukocyte Esterase   Date Value Ref Range Status   04/14/2019 MODERATE (A) NEG   Final     Potassium   Date Value Ref Range Status   04/14/2019 4.3 3.5 - 5.5 mmol/L Final     Creatinine   Date Value Ref Range Status   04/14/2019 0.86 0.6 - 1.3 MG/DL Final     BUN   Date Value Ref Range Status   04/14/2019 7 7.0 - 18 MG/DL Final Coagulation No results found for: PTP, INR, APTT

## 2019-04-15 NOTE — PERIOP NOTES
TRANSFER - IN REPORT: 
 
Verbal report received from [facility], [facility](name) on [address]  being received from 2200(unit) for ordered procedure Report consisted of patients Situation, Background, Assessment and  
Recommendations(SBAR). Information from the following report(s) Pre Procedure Checklist, Procedure Verification and Quality Measures was reviewed with the receiving nurse. Opportunity for questions and clarification was provided. Assessment completed upon patients arrival to unit and care assumed.

## 2019-04-15 NOTE — ANESTHESIA POSTPROCEDURE EVALUATION
Procedure(s): 
CYSTOSCOPY URETERAL STENT INSERTION OR REMOVAL. general 
 
Anesthesia Post Evaluation Multimodal analgesia: multimodal analgesia not used between 6 hours prior to anesthesia start to PACU discharge Patient location during evaluation: PACU Patient participation: complete - patient participated Level of consciousness: awake Pain management: adequate Airway patency: patent Anesthetic complications: no 
Cardiovascular status: stable Respiratory status: acceptable Hydration status: acceptable Post anesthesia nausea and vomiting:  controlled Vitals Value Taken Time /77 4/14/2019  9:21 PM  
Temp Pulse 130 4/14/2019  9:21 PM  
Resp 18 4/14/2019  9:21 PM  
SpO2 97 % 4/14/2019  9:21 PM

## 2019-04-15 NOTE — PROGRESS NOTES
Reason for Admission:   UTI RRAT Score:    3 Plan for utilizing home health:     no  
                 
Current Advanced Directive/Advance Care Plan: 
Not on file Likelihood of Readmission:   Low/green Transition of Care Plan: Interviewed pt with boyfriend at bedside with pt permission. Pt states that she lives with her mom and dad in a one story home with two steps to enter. She reports that she is independent with ADL's and uses no DME. She is able to obtain needed medications from [facility] pharmacy. Pt does drive. She states her boyfriend Benji Sims will be providing transportation at discharge. Denies concerns with plan to discharge home when medically ready. Patient has designated ___boyfriend_____________________ to participate in his/her discharge plan and to receive any needed information. Name: Benji Sims Address: 
Phone number: [phone] Care Management Interventions PCP Verified by CM: Yes Last Visit to PCP: 03/15/19 Mode of Transport at Discharge: Self Transition of Care Consult (CM Consult): Discharge Planning Current Support Network: Relative's Home(lives with mom and dad) Confirm Follow Up Transport: Self Plan discussed with Pt/Family/Caregiver: Yes Discharge Location Discharge Placement: Home

## 2019-04-15 NOTE — PROGRESS NOTES
Patient is tearful requesting for molina to be removed. Patient states, \"it really stings and burns. I almost want to take it out myself. \" Pain meds was given for pain but still wants molina removed. \"I will googgle how to remove it that's how bad it hurts. \" Urology oncall paged, waiting return call.

## 2019-04-15 NOTE — PROGRESS NOTES
Patient transferred to room. Noted to be in good spirit. Patient c/o slight headache but states it's getting better. Denies pain from surgery but c/o of discomfort with molina catheter. Molina draining well. Clear yellow urine with some sediments noted. Parents at beside. No distress noted at this time. Monitoring ongoing.

## 2019-04-15 NOTE — BRIEF OP NOTE
BRIEF OPERATIVE NOTE Date of Procedure: 4/14/2019 Preoperative Diagnosis: Stone in kidney [N20.0] Postoperative Diagnosis: Stone in kidney [N20.0] Procedure(s): 
CYSTOSCOPY URETERAL STENT INSERTION OR REMOVAL Surgeon(s) and Role: Johanna Allison MD - Primary Surgical Assistant: cindy 
 
Surgical Staff: 
Circ-1: Josiah Abraham RN 
Circ-2: Bernadine Priest Radiology Technician: Yojana Salvador Scrub Tech-1: Rivka Galaviz Event Time In Time Out Incision Start  8:46 PM   
Incision Close  8:51 PM   
 
Anesthesia: General  
Estimated Blood Loss: none Specimens:  
ID Type Source Tests Collected by Time Destination 1 : left kidney urine culture Urine Kidney, Left CULTURE, URINE Juani Seymour MD 4/14/2019  8:49 PM Microbiology Findings: high grade upper left ureteral obstruction Complications: none Implants:  
Implant Name Type Inv. Item Serial No.  Lot No. LRB No. Used Action URETERAL STENT    BARD [id number] Left 1 Implanted

## 2019-04-15 NOTE — PERIOP NOTES
TRANSFER - OUT REPORT: 
 
Verbal report given to sully sotomayor(name) on Marci Butler  being transferred to 2205(unit) for routine post - op Report consisted of patients Situation, Background, Assessment and  
Recommendations(SBAR). Information from the following report(s) SBAR, Procedure Summary, Intake/Output and MAR was reviewed with the receiving nurse. Lines:  
Peripheral IV 04/14/19 Right Antecubital (Active) Site Assessment Clean, dry, & intact 4/14/2019  9:20 PM  
Phlebitis Assessment 0 4/14/2019  9:20 PM  
Infiltration Assessment 0 4/14/2019  9:20 PM  
Dressing Status Clean, dry, & intact 4/14/2019  9:20 PM  
Dressing Type Transparent;Tape 4/14/2019  9:20 PM  
Hub Color/Line Status Pink; Infusing 4/14/2019  9:20 PM  
Action Taken Open ports on tubing capped 4/14/2019  5:30 PM  
Alcohol Cap Used Yes 4/14/2019  5:30 PM  
  
 
Opportunity for questions and clarification was provided. Patient transported with: 
 Registered Nurse

## 2019-04-15 NOTE — PROGRESS NOTES
0745.Bedside and Verbal shift change report given to this nurse Ham Zabala (oncoming nurse) by Camille Silverman (offgoing nurse). Report included the following information SBAR, Kardex and MAR. Patient in bed with HOB elevated bed locked at lowest position, call bell in reach. Patient is A&OX and has purposeful movement in all extremities. Patient shows no signs of distress a this time. Will continue to monitor. 1945. Bedside and Verbal shift change report given to Peter Hamilton (oncoming nurse) by this nurse Ham Zabala (offgoing nurse). Report included the following information SBAR, Kardex and MAR. ]

## 2019-04-15 NOTE — PROGRESS NOTES
Internal Medicine Progress Note Patient's Name: Joanna Lomax Admit Date: 4/14/2019 Length of Stay: 1 Assessment/Plan Principal Problem: 
  UTI (urinary tract infection) (4/14/2019) Active Problems: 
  Kidney stone () Hydronephrosis (4/14/2019) 
 
 
  
UTI Iv abx 
  
Hydronephrosis 
left sided 3mm mid to proximal ureteral calculus, 4-5 cm below UP[facility] Urology consult - appreciate services, stent placed Most likely due to kidney stone 
  
Kidney stone IV fluids Pain control Suspect infected stone WBC improving D/c tomorrow? 
 
 
- Cont acceptable home medications for chronic conditions  
- DVT protocol I have personally reviewed all pertinent labs and films that have officially resulted over the last 24 hours. I have personally checked for all pending labs that are awaiting final results. Interval History Joanna Lomax is a 26 y.o. female with a PMHx of multiple kidney stones requiring lithotripsy who presented to the ED with flank pain for the past four days. She c/o dysuria, abd pain, nausea as well. Denies fevers, chills, and vomiting. In the ED urology was consulted, and CT abd shows left sided hydronephrosis with 3mm stone, and bilateral non obstructive stones, as well as an UTI. She will be admitted for further eval.  Underwent Stent placement. Subjective Pt s/e @ bedside. No major events overnight. Pt went for stent placement of Left kidney per Urology with successful placement. WBC trend down. Pain persists. No n/v 
 
Objective Visit Vitals /69 (BP 1 Location: Left arm, BP Patient Position: Sitting) Pulse 97 Temp 97.9 °F (36.6 °C) Resp 16 Ht 5' 6\" (1.676 m) Wt 61.2 kg (135 lb) SpO2 99% Breastfeeding? No  
BMI 21.79 kg/m² Physical Exam: 
General Appearance: NAD, conversant, appears to be in pain, non toxic appearing HENT: normocephalic/atraumatic, moist mucus membranes Neck: No JVD, supple Lungs: CTA with normal respiratory effort CV: RRR, no m/r/g Abdomen: soft, non-tender, normal bowel sounds Extremities: no cyanosis, no peripheral edema Neuro: No focal deficits, motor/sensory intact Intake and Output: 
Current Shift:  04/15 0701 - 04/15 1900 In: -  
Out: 800 [Urine:800] Last three shifts:  04/13 1901 - 04/15 0700 In: 2187.9 [P.O.:240; I.V.:1947.9] Out: 950 [Urine:950] Lab/Data Reviewed: All lab results for the last 24 hours reviewed. Imaging Reviewed: Xr Cystogram 
 
Result Date: 4/15/2019 Fluoroscopy was used during surgery for this procedure under the supervision of the attending surgeon. Start Time:     0830 hours End Time:      0845 hours # of Images:  0 IMPRESSION:   Administrative report. CB Medications Reviewed: 
Current Facility-Administered Medications Medication Dose Route Frequency  zolpidem (AMBIEN) tablet 5 mg  5 mg Oral QHS PRN  
 ondansetron (ZOFRAN ODT) tablet 4 mg  4 mg Oral Q8H PRN  
 cefTRIAXone (ROCEPHIN) 1 g in 0.9% sodium chloride (MBP/ADV) 50 mL MBP  1 g IntraVENous Q24H  
 HYDROmorphone (PF) (DILAUDID) injection 1 mg  1 mg IntraVENous Q3H PRN  
 0.9% sodium chloride infusion  125 mL/hr IntraVENous CONTINUOUS  
 acetaminophen (TYLENOL) tablet 650 mg  650 mg Oral Q4H PRN  
 enoxaparin (LOVENOX) injection 40 mg  40 mg SubCUTAneous Q24H Dash Cevallos PA-C [facility] Pager: [phone] Office: [phone]

## 2019-04-15 NOTE — H&P
Medicine History and Physical 
Patient: Jany Tobin   Age:  26 y.o. Assessment Principal Problem: 
  UTI (urinary tract infection) (4/14/2019) Active Problems: 
  Kidney stone () Hydronephrosis (4/14/2019) Plan 1) uti/ possible pyelo 
 - continue pain meds, iv fluids and iv abx 2) hydronephrosis, nephrolithiasis 
 - urology consulted 
 - ivf, pain meds DISPO 
-Pt to be admitted  at this time for reasons addressed above, continued hospitalization for ongoing assessment and treatment indicated Anticipated Date of Discharge: 1-2 days Anticipated Disposition (home, SNF) : home Chief Complaint:  
Chief Complaint Patient presents with  Kidney Stone HPI:  
Jany Tobin is a 26y.o. year old female who presents with  Increasing flank pain. Patient thinks she passed a stone earlier. Patient has a history of stones since age 10. She has had lithotripsy x 2 in the past.  She was tearful on exam 
 
 
Review of Systems - positive responses in bold type Constitutional: Negative for fever, chills, diaphoresis and unexpected weight change. HENT: Negative for ear pain, congestion, sore throat, rhinorrhea, drooling, trouble swallowing, neck pain and tinnitus. Eyes: Negative for photophobia, pain, redness and visual disturbance. Respiratory: negative for shortness of breath, cough, choking, chest tightness, wheezing or stridor. Cardiovascular: Negative for chest pain, palpitations and leg swelling. Gastrointestinal: Negative for nausea, vomiting, abdominal pain, diarrhea, constipation, blood in stool, abdominal distention and anal bleeding. Genitourinary: Negative for dysuria, urgency, frequency, hematuria, flank pain and difficulty urinating. Musculoskeletal: Negative for back pain and arthralgias. Skin: Negative for color change, rash and wound. Neurological: Negative for dizziness, seizures, syncope, speech difficulty, light-headedness or headaches. Hematological: Does not bruise/bleed easily. Psychiatric/Behavioral: Negative for suicidal ideas, hallucinations, behavioral problems, self-injury or agitation Past Medical History: 
Past Medical History:  
Diagnosis Date  Asthma  Flank pain  Kidney stone Past Surgical History: 
Past Surgical History:  
Procedure Laterality Date  HX LITHOTRIPSY  07/11/2012 LITHOTRIPSY Procedure:LITHOTRIPSY; Surgeon:JASPER MERIDA; Location:[facility] MAIN OR LOC; Service:Urology; Laterality:Right; ESWL RIGHT  HX LITHOTRIPSY  08/08/2012  
 :LITHOTRIPSY; Tania Bills; Location:[facility] MAIN OR LOC; Service:Urology; Laterality:Left; ESWL LEFT CPT - 51106  
 HX TONSILLECTOMY Family History: 
Family History Problem Relation Age of Onset  Kidney Disease Mother  Kidney Disease Father  Heart Disease Father Social History: 
Social History Socioeconomic History  Marital status: SINGLE Spouse name: Not on file  Number of children: Not on file  Years of education: Not on file  Highest education level: Not on file Tobacco Use  Smoking status: Never Smoker  Smokeless tobacco: Never Used Substance and Sexual Activity  Alcohol use: Yes Alcohol/week: 1.8 oz Types: 3 Cans of beer per week Comment: occasional  
 Drug use: No  
 Sexual activity: Not Currently Partners: Male Home Medications: 
Prior to Admission medications Medication Sig Start Date End Date Taking? Authorizing Provider ALPRAZolam (XANAX) 0.25 mg tablet  3/12/19   Provider, Historical  
clindamycin-benzoyl peroxide (BENZACLIN) 1-5 % topical gel  3/12/19   Provider, Historical  
eszopiclone (LUNESTA) 2 mg tablet  3/12/19   Provider, Historical  
fluticasone propionate (FLONASE) 50 mcg/actuation nasal spray  3/12/19   Provider, Historical  
loratadine (CLARITIN) 10 mg tablet  3/12/19   Provider, Historical  
 medroxyprogesterone acetate (DEPO-PROVERA IM) by IntraMUSCular route. Provider, Historical  
ondansetron (ZOFRAN ODT) 4 mg disintegrating tablet Take 1 Tab by mouth every eight (8) hours as needed for Nausea. 6/18/17   Catie Atkins PA-C Allergies: 
No Known Allergies Physical Exam:  
 
Visit Vitals /69 (BP 1 Location: Left arm, BP Patient Position: Sitting) Pulse 97 Temp 97.9 °F (36.6 °C) Resp 16 Ht 5' 6\" (1.676 m) Wt 61.2 kg (135 lb) SpO2 99% Breastfeeding? No  
BMI 21.79 kg/m² Physical Exam: 
General appearance: alert, cooperative, no distress, appears stated age Head: Normocephalic, without obvious abnormality, atraumatic Neck: supple, trachea midline Lungs: clear to auscultation bilaterally Heart: regular rate and rhythm, S1, S2 normal, no murmur, click, rub or gallop Abdomen: + cva tenderness Extremities: extremities normal, atraumatic, no cyanosis or edema Skin: Skin color, texture, turgor normal. No rashes or lesions Neurologic: Grossly normal 
Lymph: no palpable LAD in cervical or axillary region PSY: mood and affect normal, appropriately behaved Intake and Output: 
Current Shift:  04/15 0701 - 04/15 1900 In: -  
Out: 800 [Urine:800] Last three shifts:  04/13 1901 - 04/15 0700 In: 2187.9 [P.O.:240; I.V.:1947.9] Out: 950 [Urine:950] Lab/Data Reviewed: 
CMP:  
Lab Results Component Value Date/Time  04/15/2019 04:40 AM  
 K 5.3 04/15/2019 04:40 AM  
  (H) 04/15/2019 04:40 AM  
 CO2 24 04/15/2019 04:40 AM  
 AGAP 6 04/15/2019 04:40 AM  
  (H) 04/15/2019 04:40 AM  
 BUN 8 04/15/2019 04:40 AM  
 CREA 0.72 04/15/2019 04:40 AM  
 GFRAA >60 04/15/2019 04:40 AM  
 GFRNA >60 04/15/2019 04:40 AM  
 CA 8.0 (L) 04/15/2019 04:40 AM  
 
CBC:  
Lab Results Component Value Date/Time  WBC 14.5 (H) 04/15/2019 04:40 AM  
 HGB 10.8 (L) 04/15/2019 04:40 AM  
 HCT 33.5 (L) 04/15/2019 04:40 AM  
  04/15/2019 04:40 AM  
 
 All Cardiac Markers in the last 24 hours: No results found for: CPK, CK, CKMMB, CKMB, RCK3, CKMBT, CKNDX, CKND1, PASCUAL, TROPT, TROIQ, ULI, TROPT, TNIPOC, BNP, BNPP Liya Prieto MD 
 
April 15, 2019

## 2019-04-15 NOTE — PROGRESS NOTES
Problem: Falls - Risk of 
Goal: *Absence of Falls Description Document Simielieser Bonnie Fall Risk and appropriate interventions in the flowsheet. Outcome: Progressing Towards Goal 
  
Problem: Patient Education: Go to Patient Education Activity Goal: Patient/Family Education Outcome: Progressing Towards Goal

## 2019-04-15 NOTE — PROGRESS NOTES
04/14/19 2220 Vital Signs Temp 99.2 °F (37.3 °C) Temp Source Oral  
Pulse (Heart Rate) (!) 129 Heart Rate Source Brachial  
Resp Rate 16 /76 BP 1 Location Left arm BP Patient Position At rest  
BP 1 Method Automatic Vitals O2 Sat (%) 93 % MAP (Calculated) 89 MEWS Score 3 Level of Consciousness Alert Patient's mews score remains elevated due to elevated HR at rest. Denies pain or discomfort. Monitoring ongoing.

## 2019-04-15 NOTE — ANESTHESIA PREPROCEDURE EVALUATION
Relevant Problems No relevant active problems Anesthetic History No history of anesthetic complications Review of Systems / Medical History Patient summary reviewed and pertinent labs reviewed Pulmonary Asthma Neuro/Psych Cardiovascular GI/Hepatic/Renal 
  
 
 
Renal disease: stones Endo/Other Other Findings Physical Exam 
 
Airway Mallampati: II 
TM Distance: 4 - 6 cm Neck ROM: normal range of motion Mouth opening: Normal 
 
 Cardiovascular Rhythm: regular Rate: normal 
 
 
 
 Dental 
 
Dentition: Poor dentition Pulmonary Breath sounds clear to auscultation Abdominal 
GI exam deferred Other Findings Anesthetic Plan ASA: 2 Anesthesia type: general 
 
 
 
 
Induction: Intravenous Anesthetic plan and risks discussed with: Patient

## 2019-04-15 NOTE — PROGRESS NOTES
conducted an initial consultation and Spiritual Assessment for [place] Snehal, who is a 26 y.o.,female. Patients Primary Language is: [place]. According to the patients EMR [place] Affiliation is: [place]. The reason the Patient came to the hospital is:  
Patient Active Problem List  
 Diagnosis Date Noted  UTI (urinary tract infection) 04/14/2019  Hydronephrosis 04/14/2019  Flank pain 08/14/2018  Kidney stone The  provided the following Interventions: 
Initiated a relationship of care and support. Provided information about Spiritual Care Services. Offered prayer and assurance of continued prayers on patient's behalf. The following outcomes were achieved: 
Patient expressed gratitude for 's visit. Assessment: 
There are no further spiritual or [employer] issues which require intervention at this time. Plan: 
Chaplains will continue to follow and will provide pastoral care on an as needed/requested basis. Evgeny Sweet M.Div. , [address] [address] [facility]: [phone]/Y: [phone]

## 2019-04-16 VITALS
TEMPERATURE: 98.4 F | WEIGHT: 135 LBS | BODY MASS INDEX: 21.69 KG/M2 | DIASTOLIC BLOOD PRESSURE: 80 MMHG | OXYGEN SATURATION: 100 % | RESPIRATION RATE: 18 BRPM | HEART RATE: 100 BPM | HEIGHT: 66 IN | SYSTOLIC BLOOD PRESSURE: 120 MMHG

## 2019-04-16 LAB
ANION GAP SERPL CALC-SCNC: 7 MMOL/L (ref 3–18)
BACTERIA SPEC CULT: NORMAL
BUN SERPL-MCNC: 8 MG/DL (ref 7–18)
BUN/CREAT SERPL: 11 (ref 12–20)
CALCIUM SERPL-MCNC: 8.3 MG/DL (ref 8.5–10.1)
CHLORIDE SERPL-SCNC: 114 MMOL/L (ref 100–108)
CO2 SERPL-SCNC: 25 MMOL/L (ref 21–32)
CREAT SERPL-MCNC: 0.7 MG/DL (ref 0.6–1.3)
ERYTHROCYTE [DISTWIDTH] IN BLOOD BY AUTOMATED COUNT: 12.5 % (ref 11.6–14.5)
GLUCOSE SERPL-MCNC: 99 MG/DL (ref 74–99)
HCT VFR BLD AUTO: 31.4 % (ref 35–45)
HGB BLD-MCNC: 10.2 G/DL (ref 12–16)
MCH RBC QN AUTO: 29 PG (ref 24–34)
MCHC RBC AUTO-ENTMCNC: 32.5 G/DL (ref 31–37)
MCV RBC AUTO: 89.2 FL (ref 74–97)
PLATELET # BLD AUTO: 247 K/UL (ref 135–420)
PMV BLD AUTO: 9 FL (ref 9.2–11.8)
POTASSIUM SERPL-SCNC: 3.7 MMOL/L (ref 3.5–5.5)
RBC # BLD AUTO: 3.52 M/UL (ref 4.2–5.3)
SERVICE CMNT-IMP: NORMAL
SODIUM SERPL-SCNC: 146 MMOL/L (ref 136–145)
WBC # BLD AUTO: 11.6 K/UL (ref 4.6–13.2)

## 2019-04-16 PROCEDURE — 74011250636 HC RX REV CODE- 250/636: Performed by: HOSPITALIST

## 2019-04-16 PROCEDURE — 74011250637 HC RX REV CODE- 250/637: Performed by: HOSPITALIST

## 2019-04-16 PROCEDURE — 80048 BASIC METABOLIC PNL TOTAL CA: CPT

## 2019-04-16 PROCEDURE — 74011250637 HC RX REV CODE- 250/637: Performed by: UROLOGY

## 2019-04-16 PROCEDURE — 74011000258 HC RX REV CODE- 258: Performed by: PHYSICIAN ASSISTANT

## 2019-04-16 PROCEDURE — 85027 COMPLETE CBC AUTOMATED: CPT

## 2019-04-16 PROCEDURE — 36415 COLL VENOUS BLD VENIPUNCTURE: CPT

## 2019-04-16 RX ORDER — CEPHALEXIN 500 MG/1
500 CAPSULE ORAL 3 TIMES DAILY
Qty: 21 CAP | Refills: 0 | Status: SHIPPED | OUTPATIENT
Start: 2019-04-16 | End: 2019-04-23

## 2019-04-16 RX ORDER — TAMSULOSIN HYDROCHLORIDE 0.4 MG/1
0.4 CAPSULE ORAL DAILY
Qty: 10 CAP | Refills: 0 | Status: SHIPPED | OUTPATIENT
Start: 2019-04-16 | End: 2019-04-26

## 2019-04-16 RX ORDER — OXYCODONE AND ACETAMINOPHEN 5; 325 MG/1; MG/1
1 TABLET ORAL
Qty: 18 TAB | Refills: 0 | Status: SHIPPED | OUTPATIENT
Start: 2019-04-16 | End: 2019-04-21

## 2019-04-16 RX ORDER — OXYCODONE AND ACETAMINOPHEN 5; 325 MG/1; MG/1
1 TABLET ORAL
Status: DISCONTINUED | OUTPATIENT
Start: 2019-04-16 | End: 2019-04-16 | Stop reason: HOSPADM

## 2019-04-16 RX ORDER — DOCUSATE SODIUM 100 MG/1
100 CAPSULE, LIQUID FILLED ORAL
Qty: 60 CAP | Refills: 0 | Status: SHIPPED | OUTPATIENT
Start: 2019-04-16 | End: 2019-05-16

## 2019-04-16 RX ORDER — DEXTROSE MONOHYDRATE AND SODIUM CHLORIDE 5; .45 G/100ML; G/100ML
75 INJECTION, SOLUTION INTRAVENOUS CONTINUOUS
Status: DISCONTINUED | OUTPATIENT
Start: 2019-04-16 | End: 2019-04-16 | Stop reason: HOSPADM

## 2019-04-16 RX ADMIN — HYDROMORPHONE HYDROCHLORIDE 1 MG: 1 INJECTION, SOLUTION INTRAMUSCULAR; INTRAVENOUS; SUBCUTANEOUS at 02:14

## 2019-04-16 RX ADMIN — ZOLPIDEM TARTRATE 5 MG: 5 TABLET ORAL at 02:42

## 2019-04-16 RX ADMIN — SODIUM CHLORIDE 125 ML/HR: 900 INJECTION, SOLUTION INTRAVENOUS at 05:36

## 2019-04-16 RX ADMIN — HYDROMORPHONE HYDROCHLORIDE 1 MG: 1 INJECTION, SOLUTION INTRAMUSCULAR; INTRAVENOUS; SUBCUTANEOUS at 05:34

## 2019-04-16 RX ADMIN — OXYCODONE HYDROCHLORIDE AND ACETAMINOPHEN 1 TABLET: 5; 325 TABLET ORAL at 09:28

## 2019-04-16 RX ADMIN — DEXTROSE MONOHYDRATE AND SODIUM CHLORIDE 75 ML/HR: 5; .45 INJECTION, SOLUTION INTRAVENOUS at 09:28

## 2019-04-16 NOTE — PROGRESS NOTES
Bedside and Verbal shift change report given to Cathryn Kaur RN (oncoming nurse) by Catherine Jacques RN (offgoing nurse). Report included the following information SBAR, Kardex, Intake/Output and MAR.  
 
0522 Pt stable uneventful shift medicated for pain as needed w/available prn. Sleeping most of shift with spouse at the bedside and call bell within pt reach. Bedside and Verbal shift change report given to Krista Alvarez RN (oncoming nurse) by Cathryn Kaur RN (offgoing nurse). Report included the following information SBAR, Kardex, Intake/Output and MAR.

## 2019-04-16 NOTE — PROGRESS NOTES
Urology Progress Note Assessment/Plan:  
 
Patient Active Problem List  
Diagnosis Code  Kidney stone N20.0  Flank pain R10.9  UTI (urinary tract infection) N39.0  Hydronephrosis N13.30 Doing well except stent irritation. Urine culture negative to date, but patient presented with clinical pyelonephritis. Plan:  
Patient is in pain but afebrile and has no S/S of sepsis, WBC on downward trend. Probably home later today on Percocet, colace, flomax of stent irritation and keflex, empirically. Farhad Marie MD 
 
[phone] Subjective:  
 
Daily Progress Note: 2019 8:51 AM 
 
Marci Butler is doing good. She reports pain is moderately controlled. She has complaints of  Stent irritation and left flank pain. . She is tolerating a solid diet and ambulating without assistance. Patient is voiding without difficulty, but frequently Objective:  
 
Visit Vitals /85 (BP 1 Location: Left arm, BP Patient Position: Post activity) Pulse (!) 111 Temp 98.3 °F (36.8 °C) Resp 16 Ht 5' 6\" (1.676 m) Wt 135 lb (61.2 kg) SpO2 97% Breastfeeding? No  
BMI 21.79 kg/m² Temp (24hrs), Av.2 °F (36.8 °C), Min:97.9 °F (36.6 °C), Max:98.6 °F (37 °C) Intake and Output: 
 1901 -  0700 In: 1638 [P.O.:1200; I.V.:5300] Out: 6197 [RPNFK:6931] No intake/output data recorded. PHYSICAL EXAMINATION:  
Visit Vitals /85 (BP 1 Location: Left arm, BP Patient Position: Post activity) Pulse (!) 111 Temp 98.3 °F (36.8 °C) Resp 16 Ht 5' 6\" (1.676 m) Wt 135 lb (61.2 kg) SpO2 97% Breastfeeding? No  
BMI 21.79 kg/m² Constitutional: Well developed, well-nourished female in no acute distress. HEENT: Normocephalic, Atraumatic CV:   RRR slightly tachycardic due to pain, no pain med since 5:00 AM 
Respiratory: No respiratory distress or difficulties breathing Abdomen:  Soft and nontender. No masses. No hepatosplenomegaly.  Female:  CVA tenderness: left Skin:  Normal color. No evidence of jaundice. Neuro/Psych:  Patient with appropriate affect. Alert and oriented. Lab/Data Review: All lab results for the last 24 hours reviewed. Labs:  
 
Labs: Results:  
Chemistry Recent Labs 04/16/19 
0320 04/15/19 
0440 04/14/19 
1316 GLU 99 155* 108* * 142 136  
K 3.7 5.3 4.3 * 112* 106 CO2 25 24 23 BUN 8 8 7 CREA 0.70 0.72 0.86 CA 8.3* 8.0* 8.8 AGAP 7 6 7 BUCR 11* 11* 8* AP  --   --  71  
TP  --   --  7.0 ALB  --   --  3.3*  
GLOB  --   --  3.7 AGRAT  --   --  0.9 CBC w/Diff Recent Labs 04/16/19 
0320 04/15/19 
0440 04/14/19 
1316 WBC 11.6 14.5* 18.5*  
RBC 3.52* 3.76* 3.91* HGB 10.2* 10.8* 11.5* HCT 31.4* 33.5* 34.7*  
 263 237 GRANS  --   --  86* LYMPH  --   --  5* EOS  --   --  0 Cultures Recent Labs 04/14/19 2019 04/14/19 
1309 CULT NO GROWTH AFTER 23 HOURS NO GROWTH 2 DAYS All Micro Results Procedure Component Value Units Date/Time CULTURE, URINE [[id number]] Collected:  04/14/19 2019 Order Status:  Completed Specimen:  Urine from Kidney Updated:  04/16/19 0845 Special Requests: NO SPECIAL REQUESTS Culture result: NO GROWTH AFTER 23 HOURS     
 CULTURE, URINE [[id number]] Collected:  04/14/19 1309 Order Status:  Completed Specimen:  Urine from Clean catch Updated:  04/16/19 9665 Special Requests: NO SPECIAL REQUESTS Culture result: NO GROWTH 2 DAYS Urinalysis Color Date Value Ref Range Status 04/14/2019 YELLOW   Final  
 
Appearance Date Value Ref Range Status 04/14/2019 CLEAR   Final  
 
Specific gravity Date Value Ref Range Status 04/14/2019 1.008 1.005 - 1.030   Final  
 
pH (UA) Date Value Ref Range Status 04/14/2019 6.5 5.0 - 8.0   Final  
 
Protein Date Value Ref Range Status 04/14/2019 NEGATIVE  NEG mg/dL Final  
 
Ketone Date Value Ref Range Status 04/14/2019 NEGATIVE  NEG mg/dL Final  
 
Bilirubin Date Value Ref Range Status 04/14/2019 NEGATIVE  NEG   Final  
 
Blood Date Value Ref Range Status 04/14/2019 LARGE (A) NEG   Final  
 
Urobilinogen Date Value Ref Range Status 04/14/2019 0.2 0.2 - 1.0 EU/dL Final  
 
Nitrites Date Value Ref Range Status 04/14/2019 NEGATIVE  NEG   Final  
 
Leukocyte Esterase Date Value Ref Range Status 04/14/2019 MODERATE (A) NEG   Final  
 
Potassium Date Value Ref Range Status 04/16/2019 3.7 3.5 - 5.5 mmol/L Final  
 
Creatinine Date Value Ref Range Status 04/16/2019 0.70 0.6 - 1.3 MG/DL Final  
 
BUN Date Value Ref Range Status 04/16/2019 8 7.0 - 18 MG/DL Final  
  
PSA Coagulation No results found for: PTP, INR, APTT

## 2019-04-16 NOTE — PROGRESS NOTES
Marci Mckeon, 26 y.o., 1990 SUBJECTIVE 
CC: UTI/Stone F/u day 3 
 
HPI: Ms. Van Edwards is seen for follow up today. She is s/e at bedside. She reports pain in her flank region and some groin irritation, which she believes is post op pain from the stent. Denies fever/chills. ROS: 
- General: Denies unexpected Weight loss/gain 
- HEENT: Denies changes in vision/hearing - Cardiovascular: Denies chest pain, arm or jaw pain or extremity tingling.  
- Respiratory: Denies pleuritic pain or SOB 
- Gastrointestinal: Denies Abd pain and NVCD 
- Urinary: Denies polyuria or pain with urination, Admits to hematuria - Musculoskeletal: Denies pain or stiffness 
- Neuro: Denies weakness or analgesia 
- Skin: Denies any new skin findings of concern - Psych: Denies new feelings of depression or anxiety Presenting Problems:  
- 26year old female patient presented at 1800 on 4/14 with increasing flank pain. She believes she had passed a stone earlier that day. Has a history of stones since age 10 and has previously had lithotripsy twice. PMH: 
Past Medical History:  
Diagnosis Date  Asthma    
 Flank pain    
 Kidney stone PSH: 
 HX LITHOTRIPSY   07/11/2012  
  LITHOTRIPSY Procedure:LITHOTRIPSY; Surgeon:JASPER MERIDA; Location:[facility] MAIN OR [facility]; Service:Urology; Laterality:Right; ESWL RIGHT  HX LITHOTRIPSY   08/08/2012  
  :LITHOTRIPSY; Seldon Silk; Location:[facility] OR [facility]; Service:Urology; Laterality:Left; ESWL LEFT CPT - 44145  
 HX TONSILLECTOMY SH: 
T: Never A: 1.8oz/wk D: No 
 
FH: 
Family History Problem Relation Age of Onset  Kidney Disease Mother    
 Kidney Disease Father    
 Heart Disease Father Allergies: NKA Meds: 
Prior to Admission medications Medication Sig Start Date End Date Taking? Authorizing Provider ALPRAZolam (XANAX) 0.25 mg tablet   3/12/19     Provider, Historical  
 clindamycin-benzoyl peroxide (BENZACLIN) 1-5 % topical gel   3/12/19     Provider, Historical  
eszopiclone (LUNESTA) 2 mg tablet   3/12/19     Provider, Historical  
fluticasone propionate (FLONASE) 50 mcg/actuation nasal spray   3/12/19     Provider, Historical  
loratadine (CLARITIN) 10 mg tablet   3/12/19     Provider, Historical  
medroxyprogesterone acetate (DEPO-PROVERA IM) by IntraMUSCular route.       Provider, Historical  
ondansetron (ZOFRAN ODT) 4 mg disintegrating tablet Take 1 Tab by mouth every eight (8) hours as needed for Nausea. OBJECTIVE: 
Vitals: 4071 
98.4*F, 100, 120/80, 18 br/min, 100% I/O: 5460/2580 (PO+IVF) and (Urine) Physical Exam: 
- General: Alert and oriented, in mild distress 
- HEENT: No apparent head trauma. No erythema or edema of the pharyngeal, buccal or gingival mucosa, no tonsillar enlargement, and no dental erosion.  
- Neck: No restriction in neck movement, trachea midline, thyroid of appropriate size and elevates symmetrically. - Lymphatic: No cervical, supraclavicular, or axillary lymphadenopathy 
- Heart: Tachycardic with regular rhythm and with no murmurs, rubs, or gallops - Lungs: CTA in all fields - Abdomen: No dullness to percussion. No tenderness to palpation. No hepatosplenomegaly. Mild suprapubic tenderness. Exquisite CVA tenderness on the left. - Extremities: 2+ bilateral radial and pedal pulses with no leg edema.  
- Neuro: 2+ bilateral patellar reflexes. - Psych: Patient has good insight and stable affect. Labs: 
Results for Cecily Waters (MRN [id number]) as of 4/16/2019 09:59 Ref. Range 4/15/2019 04:40 4/16/2019 03:20 WBC Latest Ref Range: 4.6 - 13.2 K/uL 14.5 (H) 11.6 Results for Cecily Waters (MRN [id number]) as of 4/16/2019 09:59 Ref. Range 4/16/2019 03:20  
RBC Latest Ref Range: 4.20 - 5.30 M/uL 3.52 (L) HGB Latest Ref Range: 12.0 - 16.0 g/dL 10.2 (L) HCT Latest Ref Range: 35.0 - 45.0 % 31.4 (L) MCV Latest Ref Range: 74.0 - 97.0 FL 89.2 MCH Latest Ref Range: 24.0 - 34.0 PG 29.0 MCHC Latest Ref Range: 31.0 - 37.0 g/dL 32.5 RDW Latest Ref Range: 11.6 - 14.5 % 12.5 PLATELET Latest Ref Range: 135 - 420 K/uL 247 MPV Latest Ref Range: 9.2 - 11.8 FL 9.0 (L) Results for Felicia Perez (MRN [id number]) as of 2019 09:59 Ref. Range 2019 03:20 Sodium Latest Ref Range: 136 - 145 mmol/L 146 (H) Potassium Latest Ref Range: 3.5 - 5.5 mmol/L 3.7 Chloride Latest Ref Range: 100 - 108 mmol/L 114 (H) CO2 Latest Ref Range: 21 - 32 mmol/L 25 Anion gap Latest Ref Range: 3.0 - 18 mmol/L 7 Glucose Latest Ref Range: 74 - 99 mg/dL 99 BUN Latest Ref Range: 7.0 - 18 MG/DL 8 Creatinine Latest Ref Range: 0.6 - 1.3 MG/DL 0.70  
BUN/Creatinine ratio Latest Ref Range: 12 - 20   11 (L) Calcium Latest Ref Range: 8.5 - 10.1 MG/DL 8.3 (L)  
GFR est non-AA Latest Ref Range: >60 ml/min/1.73m2 >60  
GFR est AA Latest Ref Range: >60 ml/min/1.73m2 >60 Imagin/14 CT Abd Pelv 1. Left-sided hydronephrosis secondary to a 3 mm, mid to proximal ureteral 
stone. 2. Bilateral nonobstructive renal calculi. ASSESSMENT/PLAN: 
1. Kidney Stone - IVF 
- Continue to monitor for passage - Monitor for continued hematuria  
- Pain meds perscribed by surgery.  
- Stent in place 2. Post stent placement 
- monitor for pain, bleeding, signs of infection 
- Pain meds perscribed by surgery 3. UTI, resolving 
- Continue IVabx  
- IVF *ATTENTION:  This note has been created by a medical student for educational purposes only. Please do not refer to the content of this note for clinical decision-making, billing, or other purposes. Please see attending physicians note to obtain clinical information on this patient. *

## 2019-04-16 NOTE — PROGRESS NOTES
Care Management Interventions PCP Verified by CM: Yes Last Visit to PCP: 03/15/19 Mode of Transport at Discharge: Self Transition of Care Consult (CM Consult): Discharge Planning Current Support Network: Relative's Home(lives with mom and dad) Confirm Follow Up Transport: Self Plan discussed with Pt/Family/Caregiver: Yes Discharge Location Discharge Placement: Home

## 2019-04-16 NOTE — DISCHARGE SUMMARY
[facility]  Hospitalist Division    Discharge Summary    Patient: Anselmo Murphy MRN: [id number]  CSN: [id number]    YOB: 1990  Age: 26 y.o. Sex: female    DOA: 4/14/2019 LOS:  LOS: 2 days   Discharge Date: 4/16/2019     Admission Diagnoses: UTI (urinary tract infection) [N39.0]    Discharge Diagnoses:    Problem List as of 4/16/2019 Date Reviewed: 4/14/2019          Codes Class Noted - Resolved    * (Principal) UTI (urinary tract infection) ICD-10-CM: N39.0  ICD-9-CM: 599.0  4/14/2019 - Present        Kidney stone ICD-10-CM: N20.0  ICD-9-CM: 592.0  Unknown - Present        Hydronephrosis ICD-10-CM: N13.30  ICD-9-CM: 253  4/14/2019 - Present        Flank pain ICD-10-CM: R10.9  ICD-9-CM: 789.09  8/14/2018 - Present              Discharge Condition: Stable    Discharge To: Home    Consults: Urology    Hospital Course:     Myah Umaña a 26 y.o. female with a PMHx of multiple kidney stones requiring lithotripsy who presented to the ED with flank pain for the past four days. Ancil Hilts c/o dysuria, abd pain, nausea as well. Denies fevers, chills, and vomiting.  In the ED urology was consulted, and CT abd shows left sided hydronephrosis with 3mm stone, and bilateral non obstructive stones, as well as an UTI.  She will be admitted for further eval.  Underwent Stent placement. Patient tolerated operation well, VSS, pain controlled with medications. Patient is to follow up with Urology outpatient for further treatment. She will be given abx, pain control medications.         Inpatient Treatment:     UTI  Iv abx     Hydronephrosis  left sided 3mm mid to proximal ureteral calculus, 4-5 cm below UPJ  Urology consult - appreciate services, stent placed  Most likely due to kidney stone     Kidney stone  IV fluids  Pain control  Suspect infected stone   WBC improving       Physical Exam:  General appearance: alert, cooperative, no distress, appears stated age  Head: Normocephalic, without obvious abnormality, atraumatic  Lungs: clear to auscultation bilaterally  Heart: regular rate and rhythm, S1, S2 normal, no murmur, click, rub or gallop  Abdomen: soft, non-tender. Bowel sounds normal. No masses,  no organomegaly  Extremities: extremities normal, atraumatic, no cyanosis or edema  Skin: Skin color, texture, turgor normal. No rashes or lesions  Neurologic: Grossly normal  PSY: Mood and affect normal, appropriately behaved    Significant Diagnostic Studies: All lab results for the last 24 hours reviewed. No results found. Discharge Medications:   Cannot display discharge medications since this patient is not currently admitted.       Activity: Activity as tolerated    Diet: Regular Diet    Wound Care: None needed    Follow-up: Urology, PCP    Discharge time: >35 Minutes     Juliana Spencer [place] [id number]  Pager: [phone]  Office: [phone]    4/16/2019, 3:10 PM

## 2019-04-16 NOTE — PROGRESS NOTES
0745  Received report from [employer]. Patient in bed awake, alert and oriented x4. Patient states that she is in pain. Dilaudid given at 0530. Let patient know we would contact MD to switch to PO pain medication. Tamar.Chancellor DUVAL switched patient to PO pain medication. Patient given pain medication, if pain controlled able to be discharged. 1015 Pain reassessed patient states she would like to be discharged as soon as possible. 1220 Patient discharged

## 2019-04-16 NOTE — PROGRESS NOTES
Problem: Falls - Risk of 
Goal: *Absence of Falls Description Document Melani Rigoberto Fall Risk and appropriate interventions in the flowsheet. Outcome: Progressing Towards Goal 
  
Problem: Patient Education: Go to Patient Education Activity Goal: Patient/Family Education Outcome: Progressing Towards Goal 
  
Problem: Altered nutrition Goal: *Optimize nutritional status Outcome: Progressing Towards Goal 
  
Problem: Discharge Planning Goal: *Discharge to safe environment Outcome: Progressing Towards Goal 
  
Problem: Pain Goal: *Control of Pain Outcome: Progressing Towards Goal 
  
Problem: Patient Education: Go to Patient Education Activity Goal: Patient/Family Education Outcome: Progressing Towards Goal

## 2019-04-16 NOTE — ROUTINE PROCESS
I have reviewed discharge instructions with the patient. The patient verbalized understanding. waiting on [facility]

## 2019-04-16 NOTE — PROGRESS NOTES
0830  Seen by Dr Rohan Cosme, for discharge today if pain  Less, informed MD for oral pain med, he said he will order oral pain med, to try on Percoset. 4386  Seen by Dr Rohan Cosme for discharge today, given Percoset if pain relieved to call hospitalist to write discharge order,. 
 
1120  Pain under control with Percoset, order for discharge done but  To be seen by Dr Richelle Ahmadi before discharge , pt informed.

## 2019-04-17 LAB
BACTERIA SPEC CULT: NORMAL
SERVICE CMNT-IMP: NORMAL

## 2019-04-18 VITALS
OXYGEN SATURATION: 100 % | BODY MASS INDEX: 22.66 KG/M2 | RESPIRATION RATE: 13 BRPM | DIASTOLIC BLOOD PRESSURE: 102 MMHG | SYSTOLIC BLOOD PRESSURE: 139 MMHG | TEMPERATURE: 97.7 F | HEIGHT: 65 IN | HEART RATE: 96 BPM | WEIGHT: 136 LBS

## 2019-04-18 LAB
ALBUMIN SERPL-MCNC: 3.2 G/DL (ref 3.4–5)
ALBUMIN/GLOB SERPL: 1 {RATIO} (ref 0.8–1.7)
ALP SERPL-CCNC: 69 U/L (ref 45–117)
ALT SERPL-CCNC: 51 U/L (ref 13–56)
ANION GAP SERPL CALC-SCNC: 9 MMOL/L (ref 3–18)
AST SERPL-CCNC: 38 U/L (ref 15–37)
BASOPHILS # BLD: 0 K/UL (ref 0–0.1)
BASOPHILS NFR BLD: 0 % (ref 0–2)
BILIRUB SERPL-MCNC: 0.1 MG/DL (ref 0.2–1)
BUN SERPL-MCNC: 13 MG/DL (ref 7–18)
BUN/CREAT SERPL: 15 (ref 12–20)
CALCIUM SERPL-MCNC: 9.1 MG/DL (ref 8.5–10.1)
CHLORIDE SERPL-SCNC: 104 MMOL/L (ref 100–108)
CO2 SERPL-SCNC: 25 MMOL/L (ref 21–32)
CREAT SERPL-MCNC: 0.86 MG/DL (ref 0.6–1.3)
DIFFERENTIAL METHOD BLD: ABNORMAL
EOSINOPHIL # BLD: 0 K/UL (ref 0–0.4)
EOSINOPHIL NFR BLD: 0 % (ref 0–5)
ERYTHROCYTE [DISTWIDTH] IN BLOOD BY AUTOMATED COUNT: 12.2 % (ref 11.6–14.5)
GLOBULIN SER CALC-MCNC: 3.3 G/DL (ref 2–4)
GLUCOSE SERPL-MCNC: 108 MG/DL (ref 74–99)
HCG SERPL QL: NEGATIVE
HCT VFR BLD AUTO: 34.5 % (ref 35–45)
HGB BLD-MCNC: 11 G/DL (ref 12–16)
LYMPHOCYTES # BLD: 1.3 K/UL (ref 0.9–3.6)
LYMPHOCYTES NFR BLD: 14 % (ref 21–52)
MCH RBC QN AUTO: 28.1 PG (ref 24–34)
MCHC RBC AUTO-ENTMCNC: 31.9 G/DL (ref 31–37)
MCV RBC AUTO: 88 FL (ref 74–97)
MONOCYTES # BLD: 0.5 K/UL (ref 0.05–1.2)
MONOCYTES NFR BLD: 5 % (ref 3–10)
NEUTS SEG # BLD: 7.6 K/UL (ref 1.8–8)
NEUTS SEG NFR BLD: 81 % (ref 40–73)
PLATELET # BLD AUTO: 364 K/UL (ref 135–420)
PMV BLD AUTO: 8.5 FL (ref 9.2–11.8)
POTASSIUM SERPL-SCNC: 4.5 MMOL/L (ref 3.5–5.5)
PROT SERPL-MCNC: 6.5 G/DL (ref 6.4–8.2)
RBC # BLD AUTO: 3.92 M/UL (ref 4.2–5.3)
SODIUM SERPL-SCNC: 138 MMOL/L (ref 136–145)
WBC # BLD AUTO: 9.4 K/UL (ref 4.6–13.2)

## 2019-04-18 PROCEDURE — 84703 CHORIONIC GONADOTROPIN ASSAY: CPT

## 2019-04-18 PROCEDURE — 74011250636 HC RX REV CODE- 250/636: Performed by: EMERGENCY MEDICINE

## 2019-04-18 PROCEDURE — 81001 URINALYSIS AUTO W/SCOPE: CPT

## 2019-04-18 PROCEDURE — 80053 COMPREHEN METABOLIC PANEL: CPT

## 2019-04-18 PROCEDURE — 87086 URINE CULTURE/COLONY COUNT: CPT

## 2019-04-18 PROCEDURE — 96374 THER/PROPH/DIAG INJ IV PUSH: CPT

## 2019-04-18 PROCEDURE — 99284 EMERGENCY DEPT VISIT MOD MDM: CPT

## 2019-04-18 PROCEDURE — 96375 TX/PRO/DX INJ NEW DRUG ADDON: CPT

## 2019-04-18 PROCEDURE — 85025 COMPLETE CBC W/AUTO DIFF WBC: CPT

## 2019-04-18 RX ORDER — KETOROLAC TROMETHAMINE 15 MG/ML
15 INJECTION, SOLUTION INTRAMUSCULAR; INTRAVENOUS
Status: COMPLETED | OUTPATIENT
Start: 2019-04-18 | End: 2019-04-18

## 2019-04-18 RX ORDER — HYDROMORPHONE HYDROCHLORIDE 1 MG/ML
1 INJECTION, SOLUTION INTRAMUSCULAR; INTRAVENOUS; SUBCUTANEOUS
Status: COMPLETED | OUTPATIENT
Start: 2019-04-18 | End: 2019-04-19

## 2019-04-18 RX ORDER — MORPHINE SULFATE 4 MG/ML
4 INJECTION INTRAVENOUS
Status: COMPLETED | OUTPATIENT
Start: 2019-04-18 | End: 2019-04-18

## 2019-04-18 RX ORDER — ONDANSETRON 2 MG/ML
4 INJECTION INTRAMUSCULAR; INTRAVENOUS
Status: COMPLETED | OUTPATIENT
Start: 2019-04-18 | End: 2019-04-18

## 2019-04-18 RX ADMIN — MORPHINE SULFATE 4 MG: 4 INJECTION INTRAVENOUS at 23:12

## 2019-04-18 RX ADMIN — ONDANSETRON 4 MG: 2 INJECTION INTRAMUSCULAR; INTRAVENOUS at 23:15

## 2019-04-18 RX ADMIN — KETOROLAC TROMETHAMINE 15 MG: 15 INJECTION, SOLUTION INTRAMUSCULAR; INTRAVENOUS at 23:14

## 2019-04-19 ENCOUNTER — HOSPITAL ENCOUNTER (EMERGENCY)
Age: 29
Discharge: HOME | End: 2019-04-19
Attending: EMERGENCY MEDICINE
Payer: COMMERCIAL

## 2019-04-19 DIAGNOSIS — N20.0 BILATERAL NEPHROLITHIASIS: ICD-10-CM

## 2019-04-19 DIAGNOSIS — R10.9 ACUTE RIGHT FLANK PAIN: Primary | ICD-10-CM

## 2019-04-19 LAB
APPEARANCE UR: ABNORMAL
BACTERIA URNS QL MICRO: ABNORMAL /HPF
BILIRUB UR QL: NEGATIVE
COLOR UR: YELLOW
EPITH CASTS URNS QL MICRO: ABNORMAL /LPF (ref 0–5)
GLUCOSE UR STRIP.AUTO-MCNC: NEGATIVE MG/DL
HGB UR QL STRIP: ABNORMAL
KETONES UR QL STRIP.AUTO: NEGATIVE MG/DL
LEUKOCYTE ESTERASE UR QL STRIP.AUTO: ABNORMAL
NITRITE UR QL STRIP.AUTO: NEGATIVE
PH UR STRIP: 8.5 [PH] (ref 5–8)
PROT UR STRIP-MCNC: 30 MG/DL
RBC #/AREA URNS HPF: ABNORMAL /HPF (ref 0–5)
SP GR UR REFRACTOMETRY: 1.01 (ref 1–1.03)
UROBILINOGEN UR QL STRIP.AUTO: 0.2 EU/DL (ref 0.2–1)
WBC URNS QL MICRO: ABNORMAL /HPF (ref 0–4)

## 2019-04-19 PROCEDURE — 96376 TX/PRO/DX INJ SAME DRUG ADON: CPT

## 2019-04-19 PROCEDURE — 96361 HYDRATE IV INFUSION ADD-ON: CPT

## 2019-04-19 PROCEDURE — 74011250637 HC RX REV CODE- 250/637: Performed by: EMERGENCY MEDICINE

## 2019-04-19 PROCEDURE — 74011250636 HC RX REV CODE- 250/636: Performed by: EMERGENCY MEDICINE

## 2019-04-19 PROCEDURE — 96375 TX/PRO/DX INJ NEW DRUG ADDON: CPT

## 2019-04-19 RX ORDER — HYDROMORPHONE HYDROCHLORIDE 2 MG/1
TABLET ORAL
Status: DISCONTINUED
Start: 2019-04-19 | End: 2019-04-19 | Stop reason: HOSPADM

## 2019-04-19 RX ORDER — IBUPROFEN 800 MG/1
800 TABLET ORAL
Qty: 30 TAB | Refills: 1 | Status: SHIPPED | OUTPATIENT
Start: 2019-04-19 | End: 2020-03-13

## 2019-04-19 RX ORDER — HYDROMORPHONE HYDROCHLORIDE 2 MG/1
2 TABLET ORAL
Status: COMPLETED | OUTPATIENT
Start: 2019-04-19 | End: 2019-04-19

## 2019-04-19 RX ORDER — KETOROLAC TROMETHAMINE 15 MG/ML
INJECTION, SOLUTION INTRAMUSCULAR; INTRAVENOUS
Status: DISCONTINUED
Start: 2019-04-19 | End: 2019-04-19 | Stop reason: HOSPADM

## 2019-04-19 RX ORDER — KETOROLAC TROMETHAMINE 15 MG/ML
15 INJECTION, SOLUTION INTRAMUSCULAR; INTRAVENOUS
Status: COMPLETED | OUTPATIENT
Start: 2019-04-19 | End: 2019-04-19

## 2019-04-19 RX ADMIN — HYDROMORPHONE HYDROCHLORIDE 2 MG: 2 TABLET ORAL at 01:18

## 2019-04-19 RX ADMIN — KETOROLAC TROMETHAMINE 15 MG: 15 INJECTION, SOLUTION INTRAMUSCULAR; INTRAVENOUS at 01:18

## 2019-04-19 RX ADMIN — SODIUM CHLORIDE 1000 ML: 900 INJECTION, SOLUTION INTRAVENOUS at 00:20

## 2019-04-19 RX ADMIN — HYDROMORPHONE HYDROCHLORIDE 1 MG: 1 INJECTION, SOLUTION INTRAMUSCULAR; INTRAVENOUS; SUBCUTANEOUS at 00:06

## 2019-04-19 NOTE — DISCHARGE INSTRUCTIONS
Patient Education        Kidney Stone: Care Instructions  Your Care Instructions    Kidney stones are formed when salts, minerals, and other substances normally found in the urine clump together. They can be as small as grains of sand or, rarely, as large as golf balls. While the stone is traveling through the ureter, which is the tube that carries urine from the kidney to the bladder, you will probably feel pain. The pain may be mild or very severe. You may also have some blood in your urine. As soon as the stone reaches the bladder, any intense pain should go away. If a stone is too large to pass on its own, you may need a medical procedure to help you pass the stone. The doctor has checked you carefully, but problems can develop later. If you notice any problems or new symptoms, get medical treatment right away. Follow-up care is a key part of your treatment and safety. Be sure to make and go to all appointments, and call your doctor if you are having problems. It's also a good idea to know your test results and keep a list of the medicines you take. How can you care for yourself at home? · Drink plenty of fluids, enough so that your urine is light yellow or clear like water. If you have kidney, heart, or liver disease and have to limit fluids, talk with your doctor before you increase the amount of fluids you drink. · Take pain medicines exactly as directed. Call your doctor if you think you are having a problem with your medicine. ? If the doctor gave you a prescription medicine for pain, take it as prescribed. ? If you are not taking a prescription pain medicine, ask your doctor if you can take an over-the-counter medicine. Read and follow all instructions on the label. · Your doctor may ask you to strain your urine so that you can collect your kidney stone when it passes. You can use a kitchen strainer or a tea strainer to catch the stone.  Store it in a plastic bag until you see your doctor again.  Preventing future kidney stones  Some changes in your diet may help prevent kidney stones. Depending on the cause of your stones, your doctor may recommend that you:  · Drink plenty of fluids, enough so that your urine is light yellow or clear like water. If you have kidney, heart, or liver disease and have to limit fluids, talk with your doctor before you increase the amount of fluids you drink. · Limit coffee, tea, and alcohol. Also avoid grapefruit juice. · Do not take more than the recommended daily dose of vitamins C and D.  · Avoid antacids such as Gaviscon, Maalox, Mylanta, or Tums. · Limit the amount of salt (sodium) in your diet. · Eat a balanced diet that is not too high in protein. · Limit foods that are high in a substance called oxalate, which can cause kidney stones. These foods include dark green vegetables, rhubarb, chocolate, wheat bran, nuts, cranberries, and beans. When should you call for help? Call your doctor now or seek immediate medical care if:    · You cannot keep down fluids.     · Your pain gets worse.     · You have a fever or chills.     · You have new or worse pain in your back just below your rib cage (the flank area).     · You have new or more blood in your urine.    Watch closely for changes in your health, and be sure to contact your doctor if:    · You do not get better as expected. Where can you learn more? Go to http://omega-erika.info/. Enter H692 in the search box to learn more about \"Kidney Stone: Care Instructions. \"  Current as of: March 14, 2018  Content Version: 11.9  © 2792-1598 [employer]. Care instructions adapted under license by [employer] (which disclaims liability or warranty for this information).  If you have questions about a medical condition or this instruction, always ask your healthcare professional. [address] any warranty or liability for your use of this information.

## 2019-04-19 NOTE — ED TRIAGE NOTES
C/o severe, stabbing RLQ abdominal pain. Patient tearful in triage. States she had kidney stones removed from bilateral kidneys today and had stent placed in left kidney. Took one percocet at 2030. No relief. Took a second percocet at 2130 with no relief.

## 2019-04-19 NOTE — ED PROVIDER NOTES
Stalin Celaya is a 26 y.o. Female with history of kidney stones who had a left ureteral stent placed today at [facility] with complaints of onset of severe right-sided pain. There was plan to put bilateral stents in but the urologist decided not to. Patient denies any fever significant hematuria, vomiting or diarrhea. She tried taking 2 Percocet at home without relief. The pain is sharp and stabbing in her right side. It is worse with movement. The history is provided by the patient and medical records. Past Medical History:   Diagnosis Date    Asthma     As a child     Flank pain     Kidney stone        Past Surgical History:   Procedure Laterality Date    CYSTOSCOPY,INSERT URETERAL STENT Left 04/2019    HX LITHOTRIPSY  07/11/2012    LITHOTRIPSY Procedure:LITHOTRIPSY; Surgeon:JASPER MERIDA; Location:[facility] OR [facility]; Service:Urology; Laterality:Right; ESWL RIGHT     HX LITHOTRIPSY  08/08/2012    :LITHOTRIPSY; Doug Goodman; Location:[facility] OR [facility]; Service:Urology; Laterality:Left; ESWL LEFT CPT - 62202    HX TONSILLECTOMY           Family History:   Problem Relation Age of Onset    Kidney Disease Mother     Kidney Disease Father     Heart Disease Father        Social History     Socioeconomic History    Marital status: SINGLE     Spouse name: Not on file    Number of children: Not on file    Years of education: Not on file    Highest education level: Not on file   Occupational History    Not on file   Social Needs    Financial resource strain: Not on file    Food insecurity:     Worry: Not on file     Inability: Not on file    Transportation needs:     Medical: Not on file     Non-medical: Not on file   Tobacco Use    Smoking status: Never Smoker    Smokeless tobacco: Never Used   Substance and Sexual Activity    Alcohol use:  Yes     Alcohol/week: 1.8 oz     Types: 3 Cans of beer per week     Comment: occasional    Drug use: No    Sexual activity: Not Currently     Partners: Male   Lifestyle    Physical activity:     Days per week: Not on file     Minutes per session: Not on file    Stress: Not on file   Relationships    Social connections:     Talks on phone: Not on file     Gets together: Not on file     Attends Yarsanism service: Not on file     Active member of club or organization: Not on file     Attends meetings of clubs or organizations: Not on file     Relationship status: Not on file    Intimate partner violence:     Fear of current or ex partner: Not on file     Emotionally abused: Not on file     Physically abused: Not on file     Forced sexual activity: Not on file   Other Topics Concern    Not on file   Social History Narrative    Not on file         ALLERGIES: Patient has no known allergies. Review of Systems   Constitutional: Negative for fever. HENT: Negative for sore throat. Eyes: Negative for visual disturbance. Respiratory: Negative for shortness of breath. Cardiovascular: Negative for chest pain. Gastrointestinal: Positive for abdominal pain. Endocrine: Negative for polyuria. Genitourinary: Positive for flank pain. Musculoskeletal: Positive for back pain. Negative for gait problem. Skin: Negative for rash. Allergic/Immunologic: Negative for immunocompromised state. Neurological: Negative for syncope. Psychiatric/Behavioral: Positive for sleep disturbance. Vitals:    04/18/19 2250 04/18/19 2336   BP: (!) 139/102    Pulse: 96    Resp: 13    Temp: 97.7 °F (36.5 °C)    SpO2: 100% 100%   Weight: 61.7 kg (136 lb)    Height: 5' 5\" (1.651 m)             Physical Exam   Constitutional: She is oriented to person, place, and time. She appears well-developed and well-nourished. She appears distressed. Appears uncomfortable and in pain but not sick. HENT:   Head: Normocephalic and atraumatic.    Right Ear: External ear normal.   Left Ear: External ear normal.   Nose: Nose normal.   Mouth/Throat: Uvula is midline, oropharynx is clear and moist and mucous membranes are normal.   Eyes: Conjunctivae are normal. No scleral icterus. Neck: Neck supple. Cardiovascular: Normal rate, regular rhythm, normal heart sounds and intact distal pulses. Pulmonary/Chest: Effort normal and breath sounds normal.   Abdominal: Soft. Normal appearance. She exhibits no distension and no mass. There is no hepatosplenomegaly. There is tenderness. There is guarding and CVA tenderness. There is no rigidity, no rebound, no tenderness at McBurney's point and negative Ta's sign. Musculoskeletal: She exhibits no edema. Neurological: She is alert and oriented to person, place, and time. Gait normal.   Skin: Skin is warm and dry. She is not diaphoretic. Psychiatric: Her behavior is normal.   Nursing note and vitals reviewed.        MDM  Number of Diagnoses or Management Options  Acute right flank pain:   Bilateral nephrolithiasis:          Procedures      Vitals:  Patient Vitals for the past 12 hrs:   Temp Pulse Resp BP SpO2   04/18/19 2336     100 %   04/18/19 2250 97.7 °F (36.5 °C) 96 13 (!) 139/102 100 %         Medications ordered:   Medications   ketorolac (TORADOL) 15 mg/mL injection (has no administration in time range)   HYDROmorphone (DILAUDID) 2 mg tablet (has no administration in time range)   sodium chloride 0.9 % bolus infusion 1,000 mL (0 mL IntraVENous IV Completed 4/19/19 0137)   ketorolac (TORADOL) injection 15 mg (15 mg IntraVENous Given 4/18/19 2314)   ondansetron (ZOFRAN) injection 4 mg (4 mg IntraVENous Given 4/18/19 2315)   morphine injection 4 mg (4 mg IntraVENous Given 4/18/19 2312)   sodium chloride 0.9 % bolus infusion 1,000 mL (0 mL IntraVENous IV Completed 4/19/19 0137)   HYDROmorphone (PF) (DILAUDID) injection 1 mg (1 mg IntraVENous Given 4/19/19 0006)   HYDROmorphone (DILAUDID) tablet 2 mg (2 mg Oral Given 4/19/19 0118)   ketorolac (TORADOL) injection 15 mg (15 mg IntraVENous Given 4/19/19 0118) Lab findings:  Recent Results (from the past 12 hour(s))   CBC WITH AUTOMATED DIFF    Collection Time: 04/18/19 11:18 PM   Result Value Ref Range    WBC 9.4 4.6 - 13.2 K/uL    RBC 3.92 (L) 4.20 - 5.30 M/uL    HGB 11.0 (L) 12.0 - 16.0 g/dL    HCT 34.5 (L) 35.0 - 45.0 %    MCV 88.0 74.0 - 97.0 FL    MCH 28.1 24.0 - 34.0 PG    MCHC 31.9 31.0 - 37.0 g/dL    RDW 12.2 11.6 - 14.5 %    PLATELET 124 528 - 639 K/uL    MPV 8.5 (L) 9.2 - 11.8 FL    NEUTROPHILS 81 (H) 40 - 73 %    LYMPHOCYTES 14 (L) 21 - 52 %    MONOCYTES 5 3 - 10 %    EOSINOPHILS 0 0 - 5 %    BASOPHILS 0 0 - 2 %    ABS. NEUTROPHILS 7.6 1.8 - 8.0 K/UL    ABS. LYMPHOCYTES 1.3 0.9 - 3.6 K/UL    ABS. MONOCYTES 0.5 0.05 - 1.2 K/UL    ABS. EOSINOPHILS 0.0 0.0 - 0.4 K/UL    ABS. BASOPHILS 0.0 0.0 - 0.1 K/UL    DF AUTOMATED     METABOLIC PANEL, COMPREHENSIVE    Collection Time: 04/18/19 11:18 PM   Result Value Ref Range    Sodium 138 136 - 145 mmol/L    Potassium 4.5 3.5 - 5.5 mmol/L    Chloride 104 100 - 108 mmol/L    CO2 25 21 - 32 mmol/L    Anion gap 9 3.0 - 18 mmol/L    Glucose 108 (H) 74 - 99 mg/dL    BUN 13 7.0 - 18 MG/DL    Creatinine 0.86 0.6 - 1.3 MG/DL    BUN/Creatinine ratio 15 12 - 20      GFR est AA >60 >60 ml/min/1.73m2    GFR est non-AA >60 >60 ml/min/1.73m2    Calcium 9.1 8.5 - 10.1 MG/DL    Bilirubin, total 0.1 (L) 0.2 - 1.0 MG/DL    ALT (SGPT) 51 13 - 56 U/L    AST (SGOT) 38 (H) 15 - 37 U/L    Alk.  phosphatase 69 45 - 117 U/L    Protein, total 6.5 6.4 - 8.2 g/dL    Albumin 3.2 (L) 3.4 - 5.0 g/dL    Globulin 3.3 2.0 - 4.0 g/dL    A-G Ratio 1.0 0.8 - 1.7     HCG QL SERUM    Collection Time: 04/18/19 11:18 PM   Result Value Ref Range    HCG, Ql. NEGATIVE  NEG     URINALYSIS W/ RFLX MICROSCOPIC    Collection Time: 04/18/19 11:23 PM   Result Value Ref Range    Color YELLOW      Appearance CLOUDY      Specific gravity 1.013 1.005 - 1.030      pH (UA) 8.5 (H) 5.0 - 8.0      Protein 30 (A) NEG mg/dL    Glucose NEGATIVE  NEG mg/dL    Ketone NEGATIVE  NEG mg/dL    Bilirubin NEGATIVE  NEG      Blood LARGE (A) NEG      Urobilinogen 0.2 0.2 - 1.0 EU/dL    Nitrites NEGATIVE  NEG      Leukocyte Esterase TRACE (A) NEG     URINE MICROSCOPIC ONLY    Collection Time: 04/18/19 11:23 PM   Result Value Ref Range    WBC 3 to 5 0 - 4 /hpf    RBC TOO NUMEROUS TO COUNT 0 - 5 /hpf    Epithelial cells FEW 0 - 5 /lpf    Bacteria FEW (A) NEG /hpf       EKG interpretation by ED Physician:      X-Ray, CT or other radiology findings or impressions:  No orders to display       Progress notes, Consult notes or additional Procedure notes:   Patient appears simply more comfortable. Pain is well controlled. There is no obvious infection acute renal dysfunction. I do not feel patient requires any repeat imaging at this time nor emergent urologic consult. Patient has been CT multiple times and discussed with patient and her mother regarding that I would avoid further CT if possible. I have discussed with patient and/or family/sig other the results, interpretation of any imaging if performed, suspected diagnosis and treatment plan to include instructions regarding the diagnoses listed to which understanding was expressed with all questions answered      Reevaluation of patient:   stable    Disposition:  Diagnosis:   1. Acute right flank pain    2. Bilateral nephrolithiasis        Disposition: home    Follow-up Information     Follow up With Specialties Details Why Contact Info    Long Nair MD Urology  office this morning to inform you were in ER [address] [facility] EMERGENCY DEPT Emergency Medicine  If symptoms worsen [address]  [phone]            Discharge Medication List as of 4/19/2019  1:14 AM      START taking these medications    Details   ibuprofen (MOTRIN) 800 mg tablet Take 1 Tab by mouth every eight (8) hours as needed for Pain (with food). , Print, Disp-30 Tab, R-1         CONTINUE these medications which have NOT CHANGED    Details   !! oxyCODONE-acetaminophen (PERCOCET) 5-325 mg per tablet Take 1 Tab by mouth every four (4) hours as needed for Pain for up to 5 days. Max Daily Amount: 6 Tabs., Print, Disp-10 Tab, R-0      !! oxyCODONE-acetaminophen (PERCOCET) 5-325 mg per tablet Take 1 Tab by mouth every six (6) hours as needed for Pain for up to 5 days. Max Daily Amount: 4 Tabs., Print, Disp-18 Tab, R-0      docusate sodium (COLACE) 100 mg capsule Take 1 Cap by mouth two (2) times daily as needed for Constipation for up to 30 days. , Print, Disp-60 Cap, R-0      cephALEXin (KEFLEX) 500 mg capsule Take 1 Cap by mouth three (3) times daily for 7 days. , Print, Disp-21 Cap, R-0      tamsulosin (FLOMAX) 0.4 mg capsule Take 1 Cap by mouth daily for 10 days. , Print, Disp-10 Cap, R-0      ALPRAZolam (XANAX) 0.25 mg tablet Take 0.25 mg by mouth as needed for Anxiety. , Historical Med      clindamycin-benzoyl peroxide (BENZACLIN) 1-5 % topical gel daily. , Historical Med      eszopiclone (LUNESTA) 2 mg tablet Take 2 mg by mouth nightly as needed., Historical Med      fluticasone propionate (FLONASE) 50 mcg/actuation nasal spray Historical Med      loratadine (CLARITIN) 10 mg tablet Take 10 mg by mouth daily. , Historical Med      medroxyprogesterone acetate (DEPO-PROVERA IM) by IntraMUSCular route., Historical Med      ondansetron (ZOFRAN ODT) 4 mg disintegrating tablet Take 1 Tab by mouth every eight (8) hours as needed for Nausea. , Print, Disp-15 Tab, R-0       !! - Potential duplicate medications found. Please discuss with provider.

## 2019-04-20 LAB
BACTERIA SPEC CULT: NORMAL
SERVICE CMNT-IMP: NORMAL

## (undated) DEVICE — TUBING IRRIG L96IN DIA0.241IN L BOR T-U-R W/ NVENT PIERCING

## (undated) DEVICE — URETERAL ACCESS SHEATH SET: Brand: NAVIGATOR

## (undated) DEVICE — TRAY PREP DRY W/ PREM GLV 2 APPL 6 SPNG 2 UNDPD 1 OVERWRAP

## (undated) DEVICE — SOLUTION IV 1000ML 0.9% SOD CHL

## (undated) DEVICE — SOLUTION IRRIG 3000ML H2O STRL BAG

## (undated) DEVICE — CONTAINER DRN 20L DISP FLUIDRN LLS

## (undated) DEVICE — SYRINGE MED 20ML STD CLR PLAS LUERLOCK TIP N CTRL DISP

## (undated) DEVICE — Device

## (undated) DEVICE — KENDALL SCD EXPRESS SLEEVES, KNEE LENGTH, MEDIUM: Brand: KENDALL SCD

## (undated) DEVICE — SOLUTION IRRIG 1000ML H2O STRL BLT

## (undated) DEVICE — STERILE LATEX POWDER-FREE SURGICAL GLOVESWITH NITRILE COATING: Brand: PROTEXIS

## (undated) DEVICE — SPONGE GZ W4XL4IN COT 12 PLY TYP VII WVN C FLD DSGN